# Patient Record
Sex: MALE | Race: WHITE | Employment: FULL TIME | URBAN - METROPOLITAN AREA
[De-identification: names, ages, dates, MRNs, and addresses within clinical notes are randomized per-mention and may not be internally consistent; named-entity substitution may affect disease eponyms.]

---

## 2017-01-16 ENCOUNTER — OFFICE VISIT (OUTPATIENT)
Dept: FAMILY MEDICINE CLINIC | Age: 72
End: 2017-01-16

## 2017-01-16 ENCOUNTER — HOSPITAL ENCOUNTER (OUTPATIENT)
Dept: LAB | Age: 72
Discharge: HOME | End: 2017-01-16
Payer: MEDICARE

## 2017-01-16 VITALS
SYSTOLIC BLOOD PRESSURE: 122 MMHG | BODY MASS INDEX: 29.2 KG/M2 | RESPIRATION RATE: 20 BRPM | HEIGHT: 70 IN | HEART RATE: 57 BPM | TEMPERATURE: 98.2 F | OXYGEN SATURATION: 98 % | DIASTOLIC BLOOD PRESSURE: 80 MMHG | WEIGHT: 204 LBS

## 2017-01-16 DIAGNOSIS — Z00.00 ROUTINE GENERAL MEDICAL EXAMINATION AT A HEALTH CARE FACILITY: ICD-10-CM

## 2017-01-16 DIAGNOSIS — Z71.89 ADVANCE CARE PLANNING: ICD-10-CM

## 2017-01-16 DIAGNOSIS — E03.4 HYPOTHYROIDISM DUE TO ACQUIRED ATROPHY OF THYROID: ICD-10-CM

## 2017-01-16 DIAGNOSIS — E03.4 HYPOTHYROIDISM DUE TO ACQUIRED ATROPHY OF THYROID: Primary | ICD-10-CM

## 2017-01-16 DIAGNOSIS — E78.00 PURE HYPERCHOLESTEROLEMIA: ICD-10-CM

## 2017-01-16 DIAGNOSIS — Z66 DNR (DO NOT RESUSCITATE): ICD-10-CM

## 2017-01-16 DIAGNOSIS — Z13.39 SCREENING FOR ALCOHOLISM: ICD-10-CM

## 2017-01-16 PROBLEM — K43.9 VENTRAL HERNIA WITHOUT OBSTRUCTION OR GANGRENE: Status: ACTIVE | Noted: 2017-01-16

## 2017-01-16 LAB
ALBUMIN SERPL BCP-MCNC: 3.7 G/DL (ref 3.4–5)
ALBUMIN/GLOB SERPL: 1.2 {RATIO} (ref 0.8–1.7)
ALP SERPL-CCNC: 73 U/L (ref 45–117)
ALT SERPL-CCNC: 26 U/L (ref 16–61)
ANION GAP BLD CALC-SCNC: 5 MMOL/L (ref 3–18)
AST SERPL W P-5'-P-CCNC: 19 U/L (ref 15–37)
BILIRUB SERPL-MCNC: 0.8 MG/DL (ref 0.2–1)
BUN SERPL-MCNC: 14 MG/DL (ref 7–18)
BUN/CREAT SERPL: 14 (ref 12–20)
CALCIUM SERPL-MCNC: 8.3 MG/DL (ref 8.5–10.1)
CHLORIDE SERPL-SCNC: 104 MMOL/L (ref 100–108)
CHOLEST SERPL-MCNC: 144 MG/DL
CO2 SERPL-SCNC: 31 MMOL/L (ref 21–32)
CREAT SERPL-MCNC: 1.01 MG/DL (ref 0.6–1.3)
ERYTHROCYTE [DISTWIDTH] IN BLOOD BY AUTOMATED COUNT: 13 % (ref 11.6–14.5)
GLOBULIN SER CALC-MCNC: 3.2 G/DL (ref 2–4)
GLUCOSE SERPL-MCNC: 79 MG/DL (ref 74–99)
HCT VFR BLD AUTO: 50.3 % (ref 36–48)
HDLC SERPL-MCNC: 44 MG/DL (ref 40–60)
HDLC SERPL: 3.3 {RATIO} (ref 0–5)
HGB BLD-MCNC: 17 G/DL (ref 13–16)
LDLC SERPL CALC-MCNC: 80.6 MG/DL (ref 0–100)
LIPID PROFILE,FLP: NORMAL
MCH RBC QN AUTO: 30.9 PG (ref 24–34)
MCHC RBC AUTO-ENTMCNC: 33.8 G/DL (ref 31–37)
MCV RBC AUTO: 91.5 FL (ref 74–97)
PLATELET # BLD AUTO: 225 K/UL (ref 135–420)
PMV BLD AUTO: 9.6 FL (ref 9.2–11.8)
POTASSIUM SERPL-SCNC: 4.5 MMOL/L (ref 3.5–5.5)
PROT SERPL-MCNC: 6.9 G/DL (ref 6.4–8.2)
RBC # BLD AUTO: 5.5 M/UL (ref 4.7–5.5)
SODIUM SERPL-SCNC: 140 MMOL/L (ref 136–145)
TRIGL SERPL-MCNC: 97 MG/DL (ref ?–150)
TSH SERPL DL<=0.05 MIU/L-ACNC: 8.33 UIU/ML (ref 0.36–3.74)
VLDLC SERPL CALC-MCNC: 19.4 MG/DL
WBC # BLD AUTO: 10.7 K/UL (ref 4.6–13.2)

## 2017-01-16 PROCEDURE — 84443 ASSAY THYROID STIM HORMONE: CPT | Performed by: INTERNAL MEDICINE

## 2017-01-16 PROCEDURE — 85027 COMPLETE CBC AUTOMATED: CPT | Performed by: INTERNAL MEDICINE

## 2017-01-16 PROCEDURE — 36415 COLL VENOUS BLD VENIPUNCTURE: CPT | Performed by: INTERNAL MEDICINE

## 2017-01-16 PROCEDURE — 80061 LIPID PANEL: CPT | Performed by: INTERNAL MEDICINE

## 2017-01-16 PROCEDURE — 80053 COMPREHEN METABOLIC PANEL: CPT | Performed by: INTERNAL MEDICINE

## 2017-01-16 RX ORDER — FLUTICASONE PROPIONATE 50 MCG
2 SPRAY, SUSPENSION (ML) NASAL DAILY
Qty: 3 BOTTLE | Refills: 3 | Status: SHIPPED | OUTPATIENT
Start: 2017-01-16 | End: 2017-10-27 | Stop reason: SDUPTHER

## 2017-01-16 RX ORDER — MONTELUKAST SODIUM 10 MG/1
10 TABLET ORAL DAILY
Qty: 90 TAB | Refills: 3 | Status: SHIPPED | OUTPATIENT
Start: 2017-01-16 | End: 2017-10-27 | Stop reason: SDUPTHER

## 2017-01-16 RX ORDER — LEVOTHYROXINE SODIUM 112 UG/1
TABLET ORAL
Qty: 90 TAB | Refills: 3 | Status: SHIPPED | OUTPATIENT
Start: 2017-01-16 | End: 2017-01-19 | Stop reason: DRUGHIGH

## 2017-01-16 RX ORDER — SIMVASTATIN 80 MG/1
TABLET, FILM COATED ORAL
Qty: 90 TAB | Refills: 3 | Status: SHIPPED | OUTPATIENT
Start: 2017-01-16 | End: 2017-10-27 | Stop reason: SDUPTHER

## 2017-01-16 NOTE — MR AVS SNAPSHOT
Visit Information Date & Time Provider Department Dept. Phone Encounter #  
 1[facility]16/2017 10:[phone] [id number] Upcoming Health Maintenance Date Due Pneumococcal 65+ Low/Medium Risk (2 of 2 - PPSV23) 1/16/2018 MEDICARE YEARLY EXAM 1/17/2018 GLAUCOMA SCREENING Q2Y 8/1/2018 COLONOSCOPY 4/18/2024 DTaP/Tdap/Td series (2 - Td) 1/16/2027 Allergies as of 1/16/2017  Review Complete On: 1/16/2017 By: Jose South MD  
  
 Severity Noted Reaction Type Reactions Latex  05/10/2013    Rash Current Immunizations  Never Reviewed Name Date Influenza Vaccine 10/11/2014 Influenza Vaccine Split 10/12/2012 Not reviewed this visit You Were Diagnosed With   
  
 Codes Comments Hypothyroidism due to acquired atrophy of thyroid    -  Primary ICD-10-CM: E03.4 ICD-9-CM: 244.8, 246.8 Pure hypercholesterolemia     ICD-10-CM: E78.00 ICD-9-CM: 272.0 Routine general medical examination at a health care facility     ICD-10-CM: Z00.00 ICD-9-CM: V70.0 Screening for alcoholism     ICD-10-CM: Z13.89 ICD-9-CM: V79.1 Advance care planning     ICD-10-CM: Z71.89 ICD-9-CM: V65.49 Vitals BP Pulse Temp Resp Height(growth percentile) Weight(growth percentile) 122/80 (BP 1 Location: Right arm, BP Patient Position: Sitting) (!) 57 98.2 °F (36.8 °C) 20 5' 10\" (1.778 m) 204 lb (92.5 kg) SpO2 BMI Smoking Status 98% 29.27 kg/m2 Former Smoker BMI and BSA Data Body Mass Index Body Surface Area  
 29.27 kg/m 2 2.14 m 2 Preferred Pharmacy Pharmacy Name Phone [facility]  [place] [id number] [address] [phone] Your Updated Medication List  
  
   
This list is accurate as of: 1/16/17 11:16 AM.  Always use your most recent med list.  
  
  
  
  
 fexofenadine 180 mg tablet Commonly known as:  Francisco Matias Take 1 Tab by mouth daily. fluticasone 50 mcg/actuation nasal spray Commonly known as:  Domnick Means 2 Sprays by Both Nostrils route daily. GLUCOSAMINE PO Take  by mouth daily. levothyroxine 112 mcg tablet Commonly known as:  SYNTHROID  
TAKE 1 TABLET DAILY BEFORE BREAKFAST  
  
 montelukast 10 mg tablet Commonly known as:  SINGULAIR Take 1 Tab by mouth daily. simvastatin 80 mg tablet Commonly known as:  ZOCOR  
TAKE 1 TABLET NIGHTLY Prescriptions Sent to Pharmacy Refills  
 montelukast (SINGULAIR) 10 mg tablet 3 Sig: Take 1 Tab by mouth daily. Class: Normal  
 Pharmacy: [address] [place] [address] Ph #: [phone] Route: Oral  
 simvastatin (ZOCOR) 80 mg tablet 3 Sig: TAKE 1 TABLET NIGHTLY Class: Normal  
 Pharmacy: [address] [place] [address] Ph #: [phone]  
 levothyroxine (SYNTHROID) 112 mcg tablet 3 Sig: TAKE 1 TABLET DAILY BEFORE BREAKFAST Class: Normal  
 Pharmacy: [address] [place] [address] Ph #: [phone]  
 fluticasone [facility]) 50 mcg/actuation nasal spray 3 Si Sprays by Both Nostrils route daily. Class: Normal  
 Pharmacy: [address] [place] [address] Ph #: [phone] Route: Both Nostrils To-Do List   
 2017 Lab:  CBC W/O DIFF   
  
 2017 Lab:  LIPID PANEL W/ REFLX DIRECT LDL   
  
 2017 Lab:  METABOLIC PANEL, COMPREHENSIVE   
  
 2017 Lab:  TSH 3RD GENERATION Patient Instructions Medicare Part B Preventive Services Limitations Recommendation Scheduled Bone Mass Measurement 
(age 72 & older, biennial) Requires diagnosis related to osteoporosis or estrogen deficiency. Biennial benefit unless patient has history of long-term glucocorticoid tx or baseline is needed because initial test was by other method Cardiovascular Screening Blood Tests (every 5 years) Total cholesterol, HDL, Triglycerides Order as a panel if possible Colorectal Cancer Screening 
-Fecal occult blood test (annual) -Flexible sigmoidoscopy (5y) 
-Screening colonoscopy (10y) -Barium Enema Counseling to Prevent Tobacco Use (up to 8 sessions per year) - Counseling greater than 3 and up to 10 minutes - Counseling greater than 10 minutes Patients must be asymptomatic of tobacco-related conditions to receive as preventive service Diabetes Screening Tests (at least every 3 years, Medicare covers annually or at 6-month intervals for prediabetic patients) Fasting blood sugar (FBS) or glucose tolerance test (GTT) Patient must be diagnosed with one of the following: 
-Hypertension, Dyslipidemia, obesity, previous impaired FBS or GTT 
Or any two of the following: overweight, FH of diabetes, age ? 72, history of gestational diabetes, birth of baby weighing more than 9 pounds Diabetes Self-Management Training (DSMT) (no USPSTF recommendation) Requires referral by treating physician for patient with diabetes or renal disease. 10 hours of initial DSMT session of no less than 30 minutes each in a continuous 12-month period. 2 hours of follow-up DSMT in subsequent years. Glaucoma Screening (no USPSTF recommendation) Diabetes mellitus, family history, , age 48 or over,  American, age 72 or over Human Immunodeficiency Virus (HIV) Screening (annually for increased risk patients) HIV-1 and HIV-2 by EIA, BERNARD, rapid antibody test, or oral mucosa transudate Patient must be at increased risk for HIV infection per USPSTF guidelines or pregnant. Tests covered annually for patients at increased risk. Pregnant patients may receive up to 3 test during pregnancy.     
Medical Nutrition Therapy (MNT) (for diabetes or renal disease not recommended schedule) Requires referral by treating physician for patient with diabetes or renal disease. Can be provided in same year as diabetes self-management training (DSMT), and CMS recommends medical nutrition therapy take place after DSMT. Up to 3 hours for initial year and 2 hours in subsequent years. Prostate Cancer Screening (annually up to age 76) - Digital rectal exam (ROSS) - Prostate specific antigen (PSA) Annually (age 48 or over), ROSS not paid separately when covered E/M service is provided on same date Men up to age 76 may need a screening blood test for prostate cancer at certain intervals, depending on their personal and family history. This decision is between the patient and his provider. Seasonal Influenza Vaccination (annually) Pneumococcal Vaccination (once after 65) Hepatitis B Vaccinations (if medium/high risk) Medium/high risk factors:  End-stage renal disease, Hemophiliacs who received Factor VIII or IX concentrates, Clients of institutions for the mentally retarded, Persons who live in the same house as a HepB virus carrier, Homosexual men, Illicit injectable drug abusers. Shingles Vaccination A shingles vaccine is also recommended once in a lifetime after age 61 Ultrasound Screening for Abdominal Aortic Aneurysm (AAA) (once) Patient must be referred through [facility] and not have had a screening for abdominal aortic aneurysm before under Medicare. Limited to patients who meet one of the following criteria: 
- Men who are 73-68 years old and have smoked more than 100 cigarettes in their lifetime. 
-Anyone with a FH of AAA 
-Anyone recommended for screening by USPSTF Introducing [facility]! Ludy Moody introduces [employer] patient portal. Now you can access parts of your medical record, email your doctor's office, and request medication refills online. 1. In your internet browser, go to [email or web address] 2. Click on the First Time User? Click Here link in the Sign In box.  You will see the New Member Sign Up page. 3. Enter your [employer] Access Code exactly as it appears below. You will not need to use this code after youve completed the sign-up process. If you do not sign up before the expiration date, you must request a new code. · [employer] Access Code: [email or web address] Expires: 3/12/2017  9:52 AM 
 
4. Enter the last four digits of your Social Security Number (xxxx) and Date of Birth (mm/dd/yyyy) as indicated and click Submit. You will be taken to the next sign-up page. 5. Create a [employer] ID. This will be your [employer] login ID and cannot be changed, so think of one that is secure and easy to remember. 6. Create a [employer] password. You can change your password at any time. 7. Enter your Password Reset Question and Answer. This can be used at a later time if you forget your password. 8. Enter your e-mail address. You will receive e-mail notification when new information is available in [address] 9. Click Sign Up. You can now view and download portions of your medical record. 10. Click the Download Summary menu link to download a portable copy of your medical information. If you have questions, please visit the Frequently Asked Questions section of the [employer] website. Remember, [employer] is NOT to be used for urgent needs. For medical emergencies, dial 911. Now available from your iPhone and Android! Please provide this summary of care documentation to your next provider. Your primary care clinician is listed as Patria 13. If you have any questions after today's visit, please call [phone].

## 2017-01-16 NOTE — PROGRESS NOTES
Assessment/Plan:    1. Hypothyroidism due to acquired atrophy of thyroid  - TSH 3RD GENERATION; Future    2. Pure hypercholesterolemia  - METABOLIC PANEL, COMPREHENSIVE; Future  - LIPID PANEL W/ REFLX DIRECT LDL; Future    3. Routine general medical examination at a health care facility  - METABOLIC PANEL, COMPREHENSIVE; Future  - LIPID PANEL W/ REFLX DIRECT LDL; Future  - CBC W/O DIFF; Future    4. Screening for alcoholism    5. Advance care planning    The plan was discussed with the patient. The patient verbalized understanding and is in agreement with the plan. All medication potential side effects were discussed with the patient. Health Maintenance:   Health Maintenance   Topic Date Due    Pneumococcal 65+ Low/Medium Risk (2 of 2 - PPSV23) 01/16/2018    MEDICARE YEARLY EXAM  01/17/2018    GLAUCOMA SCREENING Q2Y  08/01/2018    COLONOSCOPY  04/18/2024    DTaP/Tdap/Td series (2 - Td) 01/16/2027    ZOSTER VACCINE AGE 60>  Addressed    INFLUENZA AGE 9 TO ADULT  Completed     Oral Gissell is a 66 y.o. male and presents with Annual Wellness Visit     Subjective:  Pt feels well. Here for [address] Hypothyroid - due for labs. On synthroid. HLD- on statin. No side effects. ROS:  Constitutional: No recent weight change. No weakness/fatigue. No f/c. Skin: No rashes, change in nails/hair, itching   HENT: No HA, dizziness. No hearing loss/tinnitus. No nasal congestion/discharge. Eyes: No change in vision, double/blurred vision or eye pain/redness. Cardiovascular: No CP/palpitations. No SOSA/orthopnea/PND. Respiratory: No cough/sputum, dyspnea, wheezing. Gastointestinal: No dysphagia, reflux. No n/v. No constipation/diarrhea. No melena/rectal bleeding. Genitourinary: No dysuria, urinary hesitancy, nocturia, hematuria. No incontinence. Musculoskeletal: No joint pain/stiffness. No muscle pain/tenderness. Endo: No heat/cold intolerance, no polyuria/polydypsia.    Heme: No h/o anemia. No easy bleeding/bruising. Allergy/Immunology: No seasonal rhinitis. Denies frequent colds, sinus/ear infections. Neurological: No seizures/numbness/weakness. No paresthesias. Psychiatric:  No depression, anxiety. The problem list was updated as a part of today's visit. Patient Active Problem List   Diagnosis Code    ED (erectile dysfunction) N52.9    Eczema L30.9    Hypothyroid E03.9    Allergic rhinitis J30.9    Pure hypercholesterolemia E78.00    SOSA (dyspnea on exertion) R06.09    Pulmonary nodule R91.1    ERIK on CPAP G47.33    Centrilobular emphysema (HCC) J43.2     The PSH, FH were reviewed. SH:  Social History   Substance Use Topics    Smoking status: Former Smoker     Years: 8.00     Types: Cigarettes, Pipe    Smokeless tobacco: Never Used      Comment: 1971    Alcohol use No     Medications/Allergies:  Current Outpatient Prescriptions on File Prior to Visit   Medication Sig Dispense Refill    fluticasone (FLONASE) 50 mcg/actuation nasal spray 2 Sprays by Both Nostrils route daily.  montelukast (SINGULAIR) 10 mg tablet Take 1 Tab by mouth daily. 90 Tab 2    simvastatin (ZOCOR) 80 mg tablet TAKE 1 TABLET NIGHTLY 90 Tab 2    levothyroxine (SYNTHROID) 112 mcg tablet TAKE 1 TABLET DAILY BEFORE BREAKFAST 90 Tab 2    fexofenadine (ALLEGRA) 180 mg tablet Take 1 Tab by mouth daily. 90 Tab 3    GLUCOSAMINE SULFATE (GLUCOSAMINE PO) Take  by mouth daily. No current facility-administered medications on file prior to visit. Allergies   Allergen Reactions    Latex Rash     Objective:  Visit Vitals    /80 (BP 1 Location: Right arm, BP Patient Position: Sitting)    Pulse (!) 57    Temp 98.2 °F (36.8 °C)    Resp 20    Ht 5' 10\" (1.778 m)    Wt 204 lb (92.5 kg)    SpO2 98%    BMI 29.27 kg/m2      Constitutional: Well developed, nourished, no distress, alert   HENT: Exterior ears and tympanic membranes normal bilaterally. Supple neck.  No thyromegaly or lymphadenopathy. Oropharynx clear and moist mucous membranes. Eyes: Conjunctiva normal. PERRL. CV: S1, S2.  RRR. No murmurs/rubs. No thrills palpated. No carotid bruits. Intact distal pulses. No edema. Pulm: No abnormalities on inspection. Clear to auscultation bilaterally. No wheezing/rhonchi. Normal effort. GI: Soft, nontender, nondistended. Normal active bowel sounds. MS: Gait normal.  Joints without deformity/tenderness. Strength intact bilateral upper and lower ext. Normal ROM all extremities. Neuro: A/O x 3. No focal motor or sensory deficits. Speech normal.   Skin: No lesions/rashes on inspection. Psych: Appropriate affect, judgement and insight. Short-term memory intact. Labwork and Ancillary Studies:    CBC w/Diff  Lab Results   Component Value Date/Time    WBC 9.7 03/28/2016 08:43 AM    HGB 17.3 03/28/2016 08:43 AM    PLATELET 840 30/14/6901 08:43 AM         Basic Metabolic Profile/LFTs  Lab Results   Component Value Date/Time    Sodium 142 03/28/2016 08:43 AM    Potassium 4.8 03/28/2016 08:43 AM    Chloride 107 03/28/2016 08:43 AM    CO2 29 03/28/2016 08:43 AM    Anion gap 6 03/28/2016 08:43 AM    Glucose 105 03/28/2016 08:43 AM    BUN 12 03/28/2016 08:43 AM    Creatinine 1.05 03/28/2016 08:43 AM    BUN/Creatinine ratio 11 03/28/2016 08:43 AM    GFR est AA >60 03/28/2016 08:43 AM    GFR est non-AA >60 03/28/2016 08:43 AM    Calcium 8.9 03/28/2016 08:43 AM      Lab Results   Component Value Date/Time    ALT 23 03/28/2016 08:43 AM    AST 19 03/28/2016 08:43 AM    Alk.  phosphatase 76 03/28/2016 08:43 AM    Bilirubin, total 0.7 03/28/2016 08:43 AM       Cholesterol  Lab Results   Component Value Date/Time    Cholesterol, total 145 03/28/2016 08:43 AM    HDL Cholesterol 56 03/28/2016 08:43 AM    LDL, calculated 69.6 03/28/2016 08:43 AM    Triglyceride 97 03/28/2016 08:43 AM    CHOL/HDL Ratio 2.6 03/28/2016 08:43 AM           This is a Subsequent Dionne Visit providing Personalized Prevention Plan Services (PPPS) (Performed 12 months after initial AWV and PPPS )    I have reviewed the patient's medical history in detail and updated the computerized patient record. History     Past Medical History   Diagnosis Date    Allergic rhinitis     Eczema     Hypercholesterolemia     S/P colonoscopy      pt to schedule 5/12    Shortness of breath     Sinusitis chronic, maxillary     Sleep apnea      does not use a cpap    T. I.A.      2005    Thyroid disease       Past Surgical History   Procedure Laterality Date    Hx appendectomy      Pr nasal scope,bx/rmv polyp/debrid      Hx hernia repair      Hx orthopaedic       right shoulder surgery 1973     Current Outpatient Prescriptions   Medication Sig Dispense Refill    fluticasone (FLONASE) 50 mcg/actuation nasal spray 2 Sprays by Both Nostrils route daily.  montelukast (SINGULAIR) 10 mg tablet Take 1 Tab by mouth daily. 90 Tab 2    simvastatin (ZOCOR) 80 mg tablet TAKE 1 TABLET NIGHTLY 90 Tab 2    levothyroxine (SYNTHROID) 112 mcg tablet TAKE 1 TABLET DAILY BEFORE BREAKFAST 90 Tab 2    fexofenadine (ALLEGRA) 180 mg tablet Take 1 Tab by mouth daily. 90 Tab 3    GLUCOSAMINE SULFATE (GLUCOSAMINE PO) Take  by mouth daily.        Allergies   Allergen Reactions    Latex Rash     Family History   Problem Relation Age of Onset    High Cholesterol Mother     Diabetes Father     Diabetes Brother      Social History   Substance Use Topics    Smoking status: Former Smoker     Years: 8.00     Types: Cigarettes, Pipe    Smokeless tobacco: Never Used      Comment: 1971    Alcohol use No     Patient Active Problem List   Diagnosis Code    ED (erectile dysfunction) N52.9    Eczema L30.9    Hypothyroid E03.9    Allergic rhinitis J30.9    Pure hypercholesterolemia E78.00    SOSA (dyspnea on exertion) R06.09    Pulmonary nodule R91.1    ERIK on CPAP G47.33    Centrilobular emphysema (HCC) J43.2 Depression Risk Factor Screening:     PHQ 2 / 9, over the last two weeks 1/16/2017   Little interest or pleasure in doing things Not at all   Feeling down, depressed or hopeless Not at all   Total Score PHQ 2 0     Alcohol Risk Factor Screening: On any occasion during the past 3 months, have you had more than 4 drinks containing alcohol? No    Do you average more than 14 drinks per week? No    Functional Ability and Level of Safety:     Hearing Loss   normal-to-mild    Activities of Daily Living   Self-care. Requires assistance with: no ADLs    Fall Risk     Fall Risk Assessment, last 12 mths 1/16/2017   Able to walk? Yes   Fall in past 12 months? No     Abuse Screen   Patient is not abused    Review of Systems   Pertinent items are noted in HPI. Physical Examination     Evaluation of Cognitive Function:  Mood/affect:  happy  Appearance: age appropriate  Cognition: normal    Patient Care Team:  Nelsy Carmona MD as PCP - General (Internal Medicine)  Alyssa uGy RN as Ambulatory Care Navigator    Advice/Referrals/Counseling   Education and counseling provided:  Are appropriate based on today's review and evaluation  End-of-Life planning (with patient's consent)    Assessment/Plan       ICD-10-CM ICD-9-CM    1. Hypothyroidism due to acquired atrophy of thyroid E03.4 244.8 TSH 3RD GENERATION     246.8    2. Pure hypercholesterolemia Q96.44 697.0 METABOLIC PANEL, COMPREHENSIVE      LIPID PANEL W/ REFLX DIRECT LDL   3. Routine general medical examination at a health care facility D40.24 B38.3 METABOLIC PANEL, COMPREHENSIVE      LIPID PANEL W/ REFLX DIRECT LDL      CBC W/O DIFF   4. Screening for alcoholism Z13.89 V79.1    5. Advance care planning Z71.89 V65.49    .

## 2017-01-16 NOTE — PROGRESS NOTES
Haresh Stearns is a 66 y.o. male  Chief Complaint   Patient presents with   [facility] Annual Wellness Visit     1. Have you been to the ER, urgent care clinic since your last visit? Hospitalized since your last visit? No    2. Have you seen or consulted any other health care providers outside of the [address] [place] [address] since your last visit? Include any pap smears or colon screening.  No

## 2017-01-16 NOTE — PATIENT INSTRUCTIONS
Medicare Part B Preventive Services Limitations Recommendation Scheduled   Bone Mass Measurement  (age 72 & older, biennial) Requires diagnosis related to osteoporosis or estrogen deficiency. Biennial benefit unless patient has history of long-term glucocorticoid tx or baseline is needed because initial test was by other method     Cardiovascular Screening Blood Tests (every 5 years)  Total cholesterol, HDL, Triglycerides Order as a panel if possible     Colorectal Cancer Screening  -Fecal occult blood test (annual)  -Flexible sigmoidoscopy (5y)  -Screening colonoscopy (10y)  -Barium Enema      Counseling to Prevent Tobacco Use (up to 8 sessions per year)  - Counseling greater than 3 and up to 10 minutes  - Counseling greater than 10 minutes Patients must be asymptomatic of tobacco-related conditions to receive as preventive service     Diabetes Screening Tests (at least every 3 years, Medicare covers annually or at 6-month intervals for prediabetic patients)    Fasting blood sugar (FBS) or glucose tolerance test (GTT) Patient must be diagnosed with one of the following:  -Hypertension, Dyslipidemia, obesity, previous impaired FBS or GTT  Or any two of the following: overweight, FH of diabetes, age ? 72, history of gestational diabetes, birth of baby weighing more than 9 pounds     Diabetes Self-Management Training (DSMT) (no USPSTF recommendation) Requires referral by treating physician for patient with diabetes or renal disease. 10 hours of initial DSMT session of no less than 30 minutes each in a continuous 12-month period. 2 hours of follow-up DSMT in subsequent years.      Glaucoma Screening (no USPSTF recommendation) Diabetes mellitus, family history, , age 48 or over,  American, age 72 or over     Human Immunodeficiency Virus (HIV) Screening (annually for increased risk patients)  HIV-1 and HIV-2 by EIA, BERNARD, rapid antibody test, or oral mucosa transudate Patient must be at increased risk for HIV infection per USPSTF guidelines or pregnant. Tests covered annually for patients at increased risk. Pregnant patients may receive up to 3 test during pregnancy. Medical Nutrition Therapy (MNT) (for diabetes or renal disease not recommended schedule) Requires referral by treating physician for patient with diabetes or renal disease. Can be provided in same year as diabetes self-management training (DSMT), and CMS recommends medical nutrition therapy take place after DSMT. Up to 3 hours for initial year and 2 hours in subsequent years. Prostate Cancer Screening (annually up to age 76)  - Digital rectal exam (ROSS)  - Prostate specific antigen (PSA) Annually (age 48 or over), ROSS not paid separately when covered E/M service is provided on same date  Men up to age 76 may need a screening blood test for prostate cancer at certain intervals, depending on their personal and family history. This decision is between the patient and his provider. Seasonal Influenza Vaccination (annually)        Pneumococcal Vaccination (once after 72)      Hepatitis B Vaccinations (if medium/high risk) Medium/high risk factors:  End-stage renal disease,  Hemophiliacs who received Factor VIII or IX concentrates, Clients of institutions for the mentally retarded, Persons who live in the same house as a HepB virus carrier, Homosexual men, Illicit injectable drug abusers. Shingles Vaccination A shingles vaccine is also recommended once in a lifetime after age 61     Ultrasound Screening for Abdominal Aortic Aneurysm (AAA) (once) Patient must be referred through [facility] and not have had a screening for abdominal aortic aneurysm before under Medicare.   Limited to patients who meet one of the following criteria:  - Men who are 73-68 years old and have smoked more than 100 cigarettes in their lifetime.  -Anyone with a FH of AAA  -Anyone recommended for screening by USPSTF

## 2017-01-18 NOTE — PROGRESS NOTES
Tell pt his labs show he isn't getting enough thyroid. Has he been taking his thyroid med? If he has, I'm going to increase the dose and recheck his thyroid in 2 mos. Otherwise, labs looked good.

## 2017-01-19 RX ORDER — LEVOTHYROXINE SODIUM 125 UG/1
125 TABLET ORAL
Qty: 90 TAB | Refills: 0 | Status: SHIPPED | OUTPATIENT
Start: 2017-01-19 | End: 2017-04-11 | Stop reason: SDUPTHER

## 2017-01-19 NOTE — PROGRESS NOTES
Call placed to patient, notified, patient states he does take every day. Please order labs for two months, refill encounter sent for increased dose.

## 2017-04-10 ENCOUNTER — HOSPITAL ENCOUNTER (OUTPATIENT)
Dept: LAB | Age: 72
Discharge: HOME | End: 2017-04-10
Payer: MEDICARE

## 2017-04-10 ENCOUNTER — OFFICE VISIT (OUTPATIENT)
Dept: FAMILY MEDICINE CLINIC | Age: 72
End: 2017-04-10

## 2017-04-10 VITALS
WEIGHT: 198.4 LBS | HEIGHT: 70 IN | DIASTOLIC BLOOD PRESSURE: 62 MMHG | HEART RATE: 76 BPM | SYSTOLIC BLOOD PRESSURE: 104 MMHG | BODY MASS INDEX: 28.4 KG/M2 | OXYGEN SATURATION: 96 % | TEMPERATURE: 97.6 F | RESPIRATION RATE: 16 BRPM

## 2017-04-10 DIAGNOSIS — M25.511 ACUTE PAIN OF RIGHT SHOULDER: Primary | ICD-10-CM

## 2017-04-10 DIAGNOSIS — D58.2 ELEVATED HEMOGLOBIN (HCC): ICD-10-CM

## 2017-04-10 DIAGNOSIS — E03.4 HYPOTHYROIDISM DUE TO ACQUIRED ATROPHY OF THYROID: ICD-10-CM

## 2017-04-10 LAB
ERYTHROCYTE [DISTWIDTH] IN BLOOD BY AUTOMATED COUNT: 13.7 % (ref 11.6–14.5)
HCT VFR BLD AUTO: 52.5 % (ref 36–48)
HGB BLD-MCNC: 17.5 G/DL (ref 13–16)
MCH RBC QN AUTO: 31.2 PG (ref 24–34)
MCHC RBC AUTO-ENTMCNC: 33.3 G/DL (ref 31–37)
MCV RBC AUTO: 93.6 FL (ref 74–97)
PLATELET # BLD AUTO: 263 K/UL (ref 135–420)
PMV BLD AUTO: 9.8 FL (ref 9.2–11.8)
RBC # BLD AUTO: 5.61 M/UL (ref 4.7–5.5)
TSH SERPL DL<=0.05 MIU/L-ACNC: 0.17 UIU/ML (ref 0.36–3.74)
WBC # BLD AUTO: 9.6 K/UL (ref 4.6–13.2)

## 2017-04-10 PROCEDURE — 36415 COLL VENOUS BLD VENIPUNCTURE: CPT | Performed by: INTERNAL MEDICINE

## 2017-04-10 PROCEDURE — 85027 COMPLETE CBC AUTOMATED: CPT | Performed by: INTERNAL MEDICINE

## 2017-04-10 PROCEDURE — 84443 ASSAY THYROID STIM HORMONE: CPT | Performed by: INTERNAL MEDICINE

## 2017-04-10 RX ORDER — ACETAMINOPHEN 500 MG
TABLET ORAL
COMMUNITY

## 2017-04-10 RX ORDER — LEVOTHYROXINE SODIUM 125 UG/1
125 TABLET ORAL
Qty: 90 TAB | Refills: 0 | Status: CANCELLED | OUTPATIENT
Start: 2017-04-10

## 2017-04-10 NOTE — PROGRESS NOTES
Assessment/Plan:    1. Acute pain of right shoulder  -possible rotator cuff pathology. There could be element of nerve impingement as well given description. Pt to make appt with ortho, bring xray images. May need MRI or NCS to further evaluate paresthesias  - XR SHOULDER RT AP/LAT MIN 2 V; Future    2. Hypothyroidism due to acquired atrophy of thyroid  -dose had been incr to 125mcg. Po. - TSH 3RD GENERATION; Future    3. Elevated hemoglobin (HCC)  -likely related to ERIK  - CBC W/O DIFF; Future    The plan was discussed with the patient. The patient verbalized understanding and is in agreement with the plan. All medication potential side effects were discussed with the patient. Health Maintenance:   Health Maintenance   Topic Date Due    Pneumococcal 65+ Low/Medium Risk (2 of 2 - PPSV23) 01/16/2018    MEDICARE YEARLY EXAM  01/17/2018    GLAUCOMA SCREENING Q2Y  08/01/2018    COLONOSCOPY  04/18/2024    DTaP/Tdap/Td series (2 - Td) 01/16/2027    ZOSTER VACCINE AGE 60>  Addressed    INFLUENZA AGE 9 TO ADULT  Completed       Pacheco Damian is a 66 y.o. male and presents with Shoulder Pain (right)     Subjective:  Pt has h/o dislocated shoulder in 1973. Had surgery with pin placement. States it has never been a problem until 2 mos ago. Has been doing an exercise program with dumbell weights. States 2 mos ago, it \"seems like something tore\". Started having pain when he was doing a pushup and doing dumbell weights. Pain is a burning sensation in posterior shoulder and into triceps area. Pushing downward against resistance with arm forward flexed. Pain also with abduction of arm. He had some paresthesia yesterday from arm extending to hand. No neck pain. ROS:  Constitutional: No recent weight change. No weakness/fatigue. No f/c. Cardiovascular: No CP/palpitations. No SOSA/orthopnea/PND. Respiratory: No cough/sputum, dyspnea, wheezing. Gastointestinal: No dysphagia, reflux.   No n/v. No constipation/diarrhea. No melena/rectal bleeding. Musculoskeletal: + joint pain/no stiffness. No muscle pain/tenderness. Neurological: No seizures/numbness/weakness. No paresthesias. The problem list was updated as a part of today's visit. Patient Active Problem List   Diagnosis Code    ED (erectile dysfunction) N52.9    Eczema L30.9    Hypothyroid E03.9    Allergic rhinitis J30.9    Pure hypercholesterolemia E78.00    SOSA (dyspnea on exertion) R06.09    Pulmonary nodule R91.1    ERIK on CPAP G47.33, Z99.89    Centrilobular emphysema (HCC) J43.2    Advance care planning Z71.89    Ventral hernia without obstruction or gangrene K43.9       The PSH, FH were reviewed. SH:  Social History   Substance Use Topics    Smoking status: Former Smoker     Years: 8.00     Types: Cigarettes, Pipe    Smokeless tobacco: Never Used      Comment: 1971    Alcohol use No       Medications/Allergies:  Current Outpatient Prescriptions on File Prior to Visit   Medication Sig Dispense Refill    levothyroxine (SYNTHROID) 125 mcg tablet Take 1 Tab by mouth Daily (before breakfast). 90 Tab 0    montelukast (SINGULAIR) 10 mg tablet Take 1 Tab by mouth daily. 90 Tab 3    simvastatin (ZOCOR) 80 mg tablet TAKE 1 TABLET NIGHTLY 90 Tab 3    fluticasone (FLONASE) 50 mcg/actuation nasal spray 2 Sprays by Both Nostrils route daily. 3 Bottle 3    fexofenadine (ALLEGRA) 180 mg tablet Take 1 Tab by mouth daily. 90 Tab 3    GLUCOSAMINE SULFATE (GLUCOSAMINE PO) Take  by mouth daily. No current facility-administered medications on file prior to visit.          Allergies   Allergen Reactions    Latex Rash     Objective:  Visit Vitals    /62 (BP 1 Location: Left arm, BP Patient Position: Sitting)    Pulse 76    Temp 97.6 °F (36.4 °C) (Oral)    Resp 16    Ht 5' 10\" (1.778 m)    Wt 198 lb 6.4 oz (90 kg)    SpO2 96%    BMI 28.47 kg/m2      Constitutional: Well developed, nourished, no distress, alert   CV: S1, S2.  RRR. No murmurs/rubs. No thrills palpated. No carotid bruits. Intact distal pulses. No edema. Pulm: No abnormalities on inspection. Clear to auscultation bilaterally. No wheezing/rhonchi. Normal effort. MS: Gait normal.  No deltoid bursa tenderness. No triceps or biceps tenderness. Pain past 90 degrees abduction and forward flexion. No pain with supination. Neuro: A/O x 3. No focal motor or sensory deficits. Speech normal.   Psych: Appropriate affect, judgement and insight. Short-term memory intact. Labwork and Ancillary Studies:    CBC w/Diff  Lab Results   Component Value Date/Time    WBC 10.7 01/16/2017 11:28 AM    HGB 17.0 01/16/2017 11:28 AM    PLATELET 308 23/68/9059 11:28 AM         Basic Metabolic Profile/LFTs  Lab Results   Component Value Date/Time    Sodium 140 01/16/2017 11:28 AM    Potassium 4.5 01/16/2017 11:28 AM    Chloride 104 01/16/2017 11:28 AM    CO2 31 01/16/2017 11:28 AM    Anion gap 5 01/16/2017 11:28 AM    Glucose 79 01/16/2017 11:28 AM    BUN 14 01/16/2017 11:28 AM    Creatinine 1.01 01/16/2017 11:28 AM    BUN/Creatinine ratio 14 01/16/2017 11:28 AM    GFR est AA >60 01/16/2017 11:28 AM    GFR est non-AA >60 01/16/2017 11:28 AM    Calcium 8.3 01/16/2017 11:28 AM      Lab Results   Component Value Date/Time    ALT (SGPT) 26 01/16/2017 11:28 AM    AST (SGOT) 19 01/16/2017 11:28 AM    Alk.  phosphatase 73 01/16/2017 11:28 AM    Bilirubin, total 0.8 01/16/2017 11:28 AM       Cholesterol  Lab Results   Component Value Date/Time    Cholesterol, total 144 01/16/2017 11:28 AM    HDL Cholesterol 44 01/16/2017 11:28 AM    LDL, calculated 80.6 01/16/2017 11:28 AM    Triglyceride 97 01/16/2017 11:28 AM    CHOL/HDL Ratio 3.3 01/16/2017 11:28 AM

## 2017-04-10 NOTE — MR AVS SNAPSHOT
Visit Information Date & Time Provider Department Dept. Phone Encounter #  
 4/10/2017  9:00 AM Endylizzy Gomez, [facility] [phone] [id number] Upcoming Health Maintenance Date Due Pneumococcal 65+ Low/Medium Risk (2 of 2 - PPSV23) 1/16/2018 MEDICARE YEARLY EXAM 1/17/2018 GLAUCOMA SCREENING Q2Y 8/1/2018 COLONOSCOPY 4/18/2024 DTaP/Tdap/Td series (2 - Td) 1/16/2027 Allergies as of 4/10/2017  Review Complete On: 4/10/2017 By: Lieutenant Patricia MD  
  
 Severity Noted Reaction Type Reactions Latex  05/10/2013    Rash Current Immunizations  Never Reviewed Name Date Influenza Vaccine 10/11/2014 Influenza Vaccine Split 10/12/2012 Not reviewed this visit You Were Diagnosed With   
  
 Codes Comments Hypothyroidism due to acquired atrophy of thyroid    -  Primary ICD-10-CM: E03.4 ICD-9-CM: 244.8, 246.8 Elevated hemoglobin (HCC)     ICD-10-CM: S82.3 ICD-9-CM: 282.7 Acute pain of right shoulder     ICD-10-CM: M25.511 ICD-9-CM: 719.41 Vitals BP Pulse Temp Resp Height(growth percentile) Weight(growth percentile) 104/62 (BP 1 Location: Left arm, BP Patient Position: Sitting) 76 97.6 °F (36.4 °C) (Oral) 16 5' 10\" (1.778 m) 198 lb 6.4 oz (90 kg) SpO2 BMI Smoking Status 96% 28.47 kg/m2 Former Smoker Vitals History BMI and BSA Data Body Mass Index Body Surface Area  
 28.47 kg/m 2 2.11 m 2 [facility] [address] [place] [address] [phone] Your Updated Medication List  
  
   
This list is accurate as of: 4/10/17  9:25 AM.  Always use your most recent med list.  
  
  
  
  
 acetaminophen 500 mg tablet Commonly known as:  TYLENOL Take  by mouth every six (6) hours as needed for Pain. fexofenadine 180 mg tablet Commonly known as:  Jeniffer Micheal Take 1 Tab by mouth daily. fluticasone 50 mcg/actuation nasal spray Commonly known as:  Algernon Lara 2 Sprays by Both Nostrils route daily. GLUCOSAMINE PO Take  by mouth daily. levothyroxine 125 mcg tablet Commonly known as:  SYNTHROID Take 1 Tab by mouth Daily (before breakfast). montelukast 10 mg tablet Commonly known as:  SINGULAIR Take 1 Tab by mouth daily. simvastatin 80 mg tablet Commonly known as:  ZOCOR  
TAKE 1 TABLET NIGHTLY To-Do List   
 04/10/2017 Lab:  CBC W/O DIFF   
  
 04/10/2017 Lab:  TSH 3RD GENERATION   
  
 04/10/2017 Imaging:  XR SHOULDER RT AP/LAT MIN 2 V Patient Instructions You have been referred to orthopedic surgery. Please call one of the preferred providers listed below and schedule your appointment. Once you have scheduled your appointment, please call the office at [phone] and leave the details of your appointment (provider you will be seeing, appointment date and time) on the voice mail. [address] [place] [address]., [address], [address] [place] [address] Spine: Dr. Madeleine Pendleton, Dr. Maria Teresa Marshall Hand: Dr. Rah Hernandez, Dr. Inder Fraser Foot/Ankle: Dr. Juhi Velasquez, Dr. Michael Danielle Knee/Hip: Dr. Dwight Esquivel, Dr. Michael Danielle Introducing [facility]! [facility] introduces [employer] patient portal. Now you can access parts of your medical record, email your doctor's office, and request medication refills online. 1. In your internet browser, go to [email or web address] 2. Click on the First Time User? Click Here link in the Sign In box. You will see the New Member Sign Up page. 3. Enter your [employer] Access Code exactly as it appears below. You will not need to use this code after youve completed the sign-up process. If you do not sign up before the expiration date, you must request a new code. · [employer] Access Code: [email or web address] Expires: 7/9/2017  9:01 AM 
 
4. Enter the last four digits of your Social Security Number (xxxx) and Date of Birth (mm/dd/yyyy) as indicated and click Submit. You will be taken to the next sign-up page. 5. Create a [employer] ID. This will be your [employer] login ID and cannot be changed, so think of one that is secure and easy to remember. 6. Create a [employer] password. You can change your password at any time. 7. Enter your Password Reset Question and Answer. This can be used at a later time if you forget your password. 8. Enter your e-mail address. You will receive e-mail notification when new information is available in [address] 9. Click Sign Up. You can now view and download portions of your medical record. 10. Click the Download Summary menu link to download a portable copy of your medical information. If you have questions, please visit the Frequently Asked Questions section of the [employer] website. Remember, [employer] is NOT to be used for urgent needs. For medical emergencies, dial 911. Now available from your iPhone and Android! Please provide this summary of care documentation to your next provider. Your primary care clinician is listed as Patria 13. If you have any questions after today's visit, please call [phone].

## 2017-04-10 NOTE — PROGRESS NOTES
Sandra Orourke is a 66 y.o. male presents today for right shoulder pain for 2 months. Depression Screening: Completed  Fall Risk Screening: Completed    1. Have you been to the ER, urgent care clinic since your last visit? Hospitalized since your last visit? No    2. Have you seen or consulted any other health care providers outside of the [facility] since your last visit? Include any pap smears or colon screening.  No

## 2017-04-10 NOTE — PATIENT INSTRUCTIONS
You have been referred to orthopedic surgery. Please call one of the preferred providers listed below and schedule your appointment. Once you have scheduled your appointment, please call the office at [phone] and leave the details of your appointment (provider you will be seeing, appointment date and time) on the voice mail.     [address] [facility], [address] [place] [address], [address] [place] [address]      Spine: Dr. Amish Rosas, Dr. Colonel Person: Dr. Idalmis Leyva Chi  Foot/Ankle: Dr. Ayaka Park, Dr. Johnathan Camp  Knee/Hip: Dr. Hellen Collins, Dr. Lia Mendez  M-F 4-9pm  [address] [facility]   [place]  [phone]

## 2017-04-11 RX ORDER — LEVOTHYROXINE SODIUM 125 UG/1
TABLET ORAL
Qty: 60 TAB | Refills: 0 | Status: SHIPPED | OUTPATIENT
Start: 2017-04-11 | End: 2017-07-06 | Stop reason: DRUGHIGH

## 2017-04-11 RX ORDER — LEVOTHYROXINE SODIUM 112 UG/1
TABLET ORAL
Qty: 30 TAB | Refills: 0 | Status: SHIPPED | OUTPATIENT
Start: 2017-04-11 | End: 2017-07-06 | Stop reason: SDUPTHER

## 2017-04-11 NOTE — PROGRESS NOTES
Tell pt his labs show he is getting a little too much thyroid medication. I want him to alternate doses: M-F 125mcg, SaSu 112.   I sent that in for him

## 2017-04-24 ENCOUNTER — HOSPITAL ENCOUNTER (OUTPATIENT)
Dept: PHYSICAL THERAPY | Age: 72
Discharge: HOME | End: 2017-04-24
Payer: MEDICARE

## 2017-04-24 PROCEDURE — 97161 PT EVAL LOW COMPLEX 20 MIN: CPT

## 2017-04-24 PROCEDURE — 97110 THERAPEUTIC EXERCISES: CPT

## 2017-04-24 PROCEDURE — G8984 CARRY CURRENT STATUS: HCPCS

## 2017-04-24 PROCEDURE — G8985 CARRY GOAL STATUS: HCPCS

## 2017-04-24 NOTE — PROGRESS NOTES
PHYSICAL THERAPY - DAILY TREATMENT NOTE    Patient Name: Tamara Waldron        Date: 2017  : 1945   YES Patient  Verified  Visit #:     Insurance: Payor: [place] MEDICARE / Plan: [place] MEDICARE PART A & B / Product Type: Medicare /      In time: 6:06 Out time: 6:41   Total Treatment Time: 35     Medicare Time Tracking (below)   Total Timed Codes (min):  10 1:1 Treatment Time:  10     TREATMENT AREA =  Right shld    SUBJECTIVE    Pain Level (on 0 to 10 scale):  0  / 10   Medication Changes/New allergies or changes in medical history, any new surgeries or procedures? NO    If yes, update Summary List   Subjective Functional Status/Changes:  []  No changes reported     See POC          OBJECTIVE    10 min Therapeutic Exercise:  [x]  See flow sheet   Rationale:      increase ROM, increase strength and reduce pain to improve the patients ability to lift/reach     Other Objective/Functional Measures:    Shown and performed HEP     Post Treatment Pain Level (on 0 to 10) scale:   0 / 10     ASSESSMENT  Assessment/Changes in Function:     See POC     []  See Progress Note/Recertification   Patient will continue to benefit from skilled PT services to modify and progress therapeutic interventions, address functional mobility deficits, address ROM deficits, address strength deficits, analyze and address soft tissue restrictions, analyze and cue movement patterns and analyze and modify body mechanics/ergonomics to attain goals per POC.    Progress toward goals / Updated goals:    See POC     PLAN  [x]  Upgrade activities as tolerated YES Continue plan of care   []  Discharge due to :    []  Other:      Therapist: Rhea Che PT, DPT, OCS, CSCS    Date: 2017 Time: 6:07 PM

## 2017-04-24 NOTE — PROGRESS NOTES
[address] PHYSICAL THERAPY  [address] [place] [address] 51, [address],[facility], [address] [facility] - Phone: [phone]  Fax: [phone] / [facility]  Patient Name: Tomas Hoffman : 1945   Medical   Diagnosis: Right shld bursitis Treatment Diagnosis: Right shoulder pain [M25.511]   Onset Date: approx 2 months ago     Referral Source: Annalee Florez MD [place] [facility]): 2017   Prior Hospitalization: See medical history Provider #: [id number]   Prior Level of Function: Previous no pain with lifting/reaching, sleeping on shoulder   Comorbidities: Hearing impaired   Medications: Verified on Patient Summary List   The Plan of Care and following information is based on the information from the initial evaluation.   ===========================================================================================  Assessment / salcedo information:  Tomas Hoffman is a 66 y.o.  yo male with Dx: right shld pain, who reports initial pain approx 2 months ago after adding pushups to workout routine with dumbells. Pt rates pain as 7/10 max, 0/10 at best, 0/10 today increasing with reaching behind back, overhead and sleeping on shld. Prior treatment includes Advil with some relief and modification of workouts. PMHx: is sig for shld surgery in  after right shld dislocation. Objective: FOTO score = 61 (an established functional score where 100 = no disability). Shoulder AROM: flex right = 131, left = 138; ext right = 56, left = 68; ER right = 65, left = 85; scapt right = 111, left = 151, IR (measured C7 to thumb behind back) right = 46 cm w/pain,  left = 21 cm, 90/90 ER right 45 deg, left 85 deg. Strength is normal for all planes bilat with some pain with resisted scaption right. Special tests: + for Randall-Joel, cross arm, Empty can for pain. Postures does show fwd shld, protracted scap bilat.   Palpation negative with exception of general UT tenderenss bilat. Pt instructed in HEP and will f/u in clinic for PT.  ===========================================================================================  Eval Complexity: History MEDIUM  Complexity : 1-2 comorbidities / personal factors will impact the outcome/ POC ;  Examination  HIGH Complexity : 4+ Standardized tests and measures addressing body structure, function, activity limitation and / or participation in recreation ; Presentation LOW Complexity : Stable, uncomplicated ;  Decision Making MEDIUM Complexity : FOTO score of 26-74; Overall Complexity LOW   Problem List: pain affecting function, decrease ROM, decrease ADL/ functional abilitiies, decrease activity tolerance and decrease flexibility/ joint mobility FOTO = 61  Treatment Plan may include any combination of the following: Therapeutic exercise, Therapeutic activities, Neuromuscular re-education, Physical agent/modality, Manual therapy, Patient education and Self Care training  Patient / Family readiness to learn indicated by: asking questions, trying to perform skills and interest  Persons(s) to be included in education: patient (P)  Barriers to Learning/Limitations: no  Measures taken:    Patient Goal (s): \"back to normal\"   Patient self reported health status: good  Rehabilitation Potential: good   Short Term Goals: To be accomplished in  1-2  weeks:  1. Independent with HEP. 2. Decrease max pain 25-50% to assist with lifting behind back for self care.  Long Term Goals: To be accomplished in  4-6  weeks:  1. Decrease max pain 50-75% to assist with reaching overhead for ADLs. 2.  Improve FOTO Functional Status Score by 10 points in order to show significant functional improvement. 3.  Will rate a +5 on Global Rating of Change and be prepared to DC to HEP.   Frequency / Duration:   Patient to be seen  2-3  times per week for 4-6  weeks:  Patient / Caregiver education and instruction: self care and exercises  G-Codes (GP): Carry [id number] Current  CJ= 20-39%    Goal  CI= 1-19%. The severity rating is based on the FOTO Score    Therapist Signature: Cj Bui PT, DPT, OCS, CSCS Date: 7/50/7067   Certification Period: 4/24/17 - 7/21/17 Time: 6:46 PM   ===========================================================================================  I certify that the above Physical Therapy Services are being furnished while the patient is under my care. I agree with the treatment plan and certify that this therapy is necessary. Physician Signature:        Date:       Time:     Please sign and return to [facility] or you may fax the signed copy to [phone]. Thank you.

## 2017-04-26 ENCOUNTER — IMPORTED ENCOUNTER (OUTPATIENT)
Dept: URBAN - METROPOLITAN AREA CLINIC 1 | Facility: CLINIC | Age: 72
End: 2017-04-26

## 2017-04-26 ENCOUNTER — HOSPITAL ENCOUNTER (OUTPATIENT)
Dept: PHYSICAL THERAPY | Age: 72
Discharge: HOME | End: 2017-04-26
Payer: MEDICARE

## 2017-04-26 PROBLEM — H40.013: Noted: 2017-04-26

## 2017-04-26 PROBLEM — H43.811: Noted: 2017-04-26

## 2017-04-26 PROBLEM — H25.813: Noted: 2017-04-26

## 2017-04-26 PROCEDURE — 92004 COMPRE OPH EXAM NEW PT 1/>: CPT

## 2017-04-26 PROCEDURE — 97110 THERAPEUTIC EXERCISES: CPT

## 2017-04-26 PROCEDURE — 97140 MANUAL THERAPY 1/> REGIONS: CPT

## 2017-04-26 PROCEDURE — 92015 DETERMINE REFRACTIVE STATE: CPT

## 2017-04-26 NOTE — PATIENT DISCUSSION
1.  Cataract OU:  Visually Significant discussed the risks benefits alternatives and limitations of cataract surgery. The patient stated a full understanding and a desire to proceed with the procedure. The patient will need to return for preop appointment with cataract measurements and to have any additional questions answered and start pre-operative eye drops as directed. OS then OD. Patient did not see PMG today Phaco PCLOtherwise follow-up2. PVD w/o Tear OD - Patient was cautioned to call our office immediately if they experience   a substantial change in their symptoms such as an increase in floaters persistent flashes loss of visual field (may appear as a shadow or a curtain) or decrease in visual acuity as these may indicate a retinal tear or detachment. 3.  COAG suspect OU: (0.60/0.55)  IOP 12/11. Condition was discussed with patient. Will monitor patient for progression. Work up after work up 4. Return for an appointment for H and P. with Dr. Roosevelt Root.

## 2017-04-26 NOTE — PROGRESS NOTES
PHYSICAL THERAPY - DAILY TREATMENT NOTE    Patient Name: Yuri Mckeon        Date: 2017  : 1945   YES Patient  Verified  Visit #:     Insurance: Payor: Mima Maurice / Plan: [place] MEDICARE PART A & B / Product Type: Medicare /      In time: 6 Out time: 650   Total Treatment Time: 50     Medicare Time Tracking (below)   Total Timed Codes (min):  50 1:1 Treatment Time:  50     TREATMENT AREA =  Right shoulder pain [M25.511]    SUBJECTIVE  Pain Level (on 0 to 10 scale):  1  / 10   Medication Changes/New allergies or changes in medical history, any new surgeries or procedures? NO    If yes, update Summary List   Subjective Functional Status/Changes:  []  No changes reported     Pain on the side of the shoulder. Takes a while to warm-up, and generally has some spasm-type pain on the (R) side of the neck while warming up. Usually goes away after a few minutes. OBJECTIVE  35 min Therapeutic Exercise:  [x]  See flow sheet   Rationale:      increase ROM, increase strength, improve coordination and improve balance to improve the patients ability to perform pain free ADLs. 15 min Manual Therapy: STM/DTM (R) c/s paraspinals, UT/LS, rhomboids, infraspinatus, teres minor, mid delt. Ap ghj mobs. TPR pec minor. Rationale:      decrease pain, increase ROM, increase tissue extensibility and decrease trigger points to improve patient's ability to perform pain free ADLs. min Patient Education:  YES  Reviewed HEP   []  Progressed/Changed HEP based on: Other Objective/Functional Measures: Added multiple exercises to improve upper trunk mobility and strength for ADLs. Post Treatment Pain Level (on 0 to 10) scale:   0  / 10     ASSESSMENT  Assessment/Changes in Function:     Good tolerance to initial session.        []  See Progress Note/Recertification   Patient will continue to benefit from skilled PT services to modify and progress therapeutic interventions, address functional mobility deficits, address ROM deficits, address strength deficits, analyze and address soft tissue restrictions, analyze and cue movement patterns, analyze and modify body mechanics/ergonomics and assess and modify postural abnormalities to attain remaining goals. Progress toward goals / Updated goals:    Initiated therex.       PLAN  [x]  Upgrade activities as tolerated YES Continue plan of care   []  Discharge due to :    []  Other:      Therapist: Celine Bates PTA    Date: 4/26/2017 Time: 6:33 PM     Future Appointments  Date Time Provider [place]   5/1/2017 5:00 PM Yulia Casarez PT [facility]   5/3/2017 5:00 PM Celine Bates PTA [facility]   5/8/2017 5:00 PM Yulia Casarez, PT [facility]   5/10/2017 5:00 PM Celine Bates PTA [facility]   5/15/2017 5:00 PM Celine Bates PTA [facility]   5/22/2017 6:00 PM Celine Bates PTA [facility]   5/24/2017 5:00 PM Celine Bates PTA [facility]

## 2017-05-01 ENCOUNTER — HOSPITAL ENCOUNTER (OUTPATIENT)
Dept: PHYSICAL THERAPY | Age: 72
Discharge: HOME | End: 2017-05-01
Payer: MEDICARE

## 2017-05-01 PROCEDURE — 97140 MANUAL THERAPY 1/> REGIONS: CPT

## 2017-05-01 NOTE — PROGRESS NOTES
PHYSICAL THERAPY - DAILY TREATMENT NOTE    Patient Name: Jennifer Waters        Date: 2017  : 1945   YES Patient  Verified  Visit #:   3   of   18  Insurance: Payor: [place] MED[employer]ARE / Plan: [place] MEDICARE PART A & B / Product Type: Medicare /      In time: 5:12 Out time: 5:44   Total Treatment Time: 32     Medicare Time Tracking (below)   Total Timed Codes (min):  10 1:1 Treatment Time:  10     TREATMENT AREA =  Right shoulder pain [M25.511]    SUBJECTIVE  Pain Level (on 0 to 10 scale):  0  / 10   Medication Changes/New allergies or changes in medical history, any new surgeries or procedures? NO    If yes, update Summary List   Subjective Functional Status/Changes:  []  No changes reported     [place] good on Saturday with improved ability to sleep on right side; still some diff/pain with reaching overhead and behind back; indep with HEP          OBJECTIVE    10 Min Manual Therapy: DTM pec minor; [address] mob post,inf; PROM/stretch all planes   Rationale:      decrease pain, increase ROM and increase tissue extensibility to improve patient's ability to lift/reach    22 min Therapeutic Exercise:  [x]  See flow sheet   Rationale:      increase ROM and increase strength to improve the patients ability to lift/reach      min Patient Education:  YES  Reviewed HEP   []  Progressed/Changed HEP based on: Other Objective/Functional Measures: Added SL ER with good danial  Held UBE due to patient being 12 min late     Post Treatment Pain Level (on 0 to 10) scale:   2  / 10     ASSESSMENT  Assessment/Changes in Function:     Good danial to all Rx  Cont tightness/reduced ROM with IR behind back     []  See Progress Note/Recertification   Patient will continue to benefit from skilled PT services to modify and progress therapeutic interventions, address functional mobility deficits, address ROM deficits, address strength deficits and analyze and address soft tissue restrictions to attain remaining goals.    Progress toward goals / Updated goals:    STG 2 progressing, improved pain levels, still tight behind back     PLAN  [x]  Upgrade activities as tolerated YES Continue plan of care   []  Discharge due to :    []  Other:      Therapist: Samia Patterson PT, DPT, OCS, CSCS    Date: 5/1/2017 Time: 5:28 PM     Future Appointments  Date Time Provider Sol Pompa   5/3/2017 5:00 PM Madelyne Peabody, PTA [facility]   5/8/2017 5:00 PM Loren Méndez PT [facility]   5/10/2017 5:00 PM Madelyne Peabody, PTA [facility]   5/15/2017 5:00 PM Madelyne Peabody, PTA [facility]   5/22/2017 6:00 PM Madelyne Peabody, PTA [facility]   5/24/2017 5:00 PM Madelyne Peabody, [facility]

## 2017-05-03 ENCOUNTER — HOSPITAL ENCOUNTER (OUTPATIENT)
Dept: PHYSICAL THERAPY | Age: 72
Discharge: HOME | End: 2017-05-03
Payer: MEDICARE

## 2017-05-03 PROCEDURE — 97110 THERAPEUTIC EXERCISES: CPT

## 2017-05-03 PROCEDURE — 97140 MANUAL THERAPY 1/> REGIONS: CPT

## 2017-05-03 NOTE — PROGRESS NOTES
PHYSICAL THERAPY - DAILY TREATMENT NOTE    Patient Name: Sandra Orourke        Date: 5/3/2017  : 1945   YES Patient  Verified  Visit #:     Insurance: Payor: Eva Bliss / Plan: [place] MEDICARE PART A & B / Product Type: Medicare /      In time: 450 Out time: 545   Total Treatment Time: 55     Medicare Time Tracking (below)   Total Timed Codes (min):  55 1:1 Treatment Time:  30     TREATMENT AREA =  Right shoulder pain [M25.511]    SUBJECTIVE  Pain Level (on 0 to 10 scale):    / 10   Medication Changes/New allergies or changes in medical history, any new surgeries or procedures? NO    If yes, update Summary List   Subjective Functional Status/Changes:  []  No changes reported     Had some soreness after the last session. States getting the shoulder pressed on hurt, but felt better the next day. OBJECTIVE    40 (15) min Therapeutic Exercise:  [x]  See flow sheet   Rationale:      increase ROM, increase strength and improve coordination to improve the patients ability to perform pain free ADLs. 15 Min Manual Therapy: STM/DTM (R) periscapular mms, UT/Scalenes, and mid delt. (R) shoulder PROM. Rationale:      decrease pain, increase ROM, increase tissue extensibility and decrease trigger points to improve patient's ability to perform pain free ADLs. min Patient Education:  YES  Reviewed HEP   []  Progressed/Changed HEP based on: Other Objective/Functional Measures:    (R) shoulder PROM flexion ~145 deg prior to pain/soft tissue limitation. (I) with therex. Post Treatment Pain Level (on 0 to 10) scale:   1  / 10     ASSESSMENT  Assessment/Changes in Function:     Good tolerance to therex. TTP @ (R) UT/Scalenes.        []  See Progress Note/Recertification   Patient will continue to benefit from skilled PT services to modify and progress therapeutic interventions, address functional mobility deficits, address ROM deficits, address strength deficits, analyze and address soft tissue restrictions, analyze and cue movement patterns, analyze and modify body mechanics/ergonomics and assess and modify postural abnormalities to attain remaining goals. Progress toward goals / Updated goals:    No change in progress toward LTG's with today's session.       PLAN  [x]  Upgrade activities as tolerated YES Continue plan of care   []  Discharge due to :    []  Other:      Therapist: Светлана Allen PTA    Date: 5/3/2017 Time: 5:06 PM     Future Appointments  Date Time Provider Sol Pompa   5/8/2017 5:00 PM Angela Chacko, PT [facility]   5/10/2017 5:00 PM Светлана Allen PTA [facility]   5/15/2017 5:00 PM Светлана Allen PTA [facility]   5/22/2017 6:00 PM Светлана Allen PTA [facility]   5/24/2017 5:00 PM Светлана Allen PTA [facility]

## 2017-05-08 ENCOUNTER — HOSPITAL ENCOUNTER (OUTPATIENT)
Dept: PHYSICAL THERAPY | Age: 72
Discharge: HOME | End: 2017-05-08
Payer: MEDICARE

## 2017-05-08 PROCEDURE — 97110 THERAPEUTIC EXERCISES: CPT

## 2017-05-08 PROCEDURE — 97140 MANUAL THERAPY 1/> REGIONS: CPT

## 2017-05-08 NOTE — PROGRESS NOTES
PHYSICAL THERAPY - DAILY TREATMENT NOTE    Patient Name: Phillip Rodriguez        Date: 2017  : 1945   YES Patient  Verified  Visit #:      18  Insurance: Payor: Rojelio Meek / Plan: [place] MEDICARE PART A & B / Product Type: Medicare /      In time: 4:50 Out time: 5:41   Total Treatment Time: 51     Medicare Time Tracking (below)   Total Timed Codes (min):  51 1:1 Treatment Time:  40     TREATMENT AREA =  Right shoulder pain [M25.511]    SUBJECTIVE  Pain Level (on 0 to 10 scale):  0  / 10   Medication Changes/New allergies or changes in medical history, any new surgeries or procedures? NO    If yes, update Summary List   Subjective Functional Status/Changes:  []  No changes reported     Still some difficulty with lifting behind back but not as bad as it was          OBJECTIVE    8 Min Manual Therapy: [address] mob post/inf; DTM pec minor; PROM/stretch all planes   Rationale:      decrease pain, increase ROM and increase tissue extensibility to improve patient's ability to lift/reach    43 min Therapeutic Exercise:  [x]  See flow sheet   Rationale:      increase ROM and increase strength to improve the patients ability to lift/reach      min Patient Education:  YES  Reviewed HEP   []  Progressed/Changed HEP based on: Other Objective/Functional Measures:    IR behind back 38cm (was 46 cm on IE, showing 8 cm improvement)       Post Treatment Pain Level (on 0 to 10) scale:   1-2  / 10     ASSESSMENT  Assessment/Changes in Function:     Some improvement with IR behind back noted  Good danial to exs/Rx     []  See Progress Note/Recertification   Patient will continue to benefit from skilled PT services to modify and progress therapeutic interventions, address functional mobility deficits, address ROM deficits, address strength deficits and analyze and address soft tissue restrictions to attain remaining goals.    Progress toward goals / Updated goals:    FOTO next Rx     PLAN  [x]  Upgrade activities as tolerated YES Continue plan of care   []  Discharge due to :    []  Other:      Therapist: Janet Mason PT, DPT, OCS, CSCS    Date: 5/8/2017 Time: 4:53 PM     Future Appointments  Date Time Provider Sol Pompa   5/8/2017 5:00 PM Georgie Andrade PT [facility]   5/10/2017 5:00 PM Nicole Juárez PTA [facility]   5/15/2017 5:00 PM Nicole Juárez PTA [facility]   5/22/2017 6:00 PM Nicole Juárez PTA [facility]   5/24/2017 5:00 PM Nicole Juárez [facility]

## 2017-05-10 ENCOUNTER — HOSPITAL ENCOUNTER (OUTPATIENT)
Dept: PHYSICAL THERAPY | Age: 72
Discharge: HOME | End: 2017-05-10
Payer: MEDICARE

## 2017-05-10 PROCEDURE — 97110 THERAPEUTIC EXERCISES: CPT

## 2017-05-10 PROCEDURE — 97140 MANUAL THERAPY 1/> REGIONS: CPT

## 2017-05-10 NOTE — PROGRESS NOTES
PHYSICAL THERAPY - DAILY TREATMENT NOTE    Patient Name: Elo Art        Date: 5/10/2017  : 1945   YES Patient  Verified  Visit #:     Insurance: Payor: Miriam Ramires / Plan: [place] MEDICARE PART A & B / Product Type: Medicare /      In time: 455 Out time: 550   Total Treatment Time: 55     Medicare Time Tracking (below)   Total Timed Codes (min):  55 1:1 Treatment Time:  40     TREATMENT AREA =  Right shoulder pain [M25.511]    SUBJECTIVE  Pain Level (on 0 to 10 scale): 0  / 10   Medication Changes/New allergies or changes in medical history, any new surgeries or procedures? NO    If yes, update Summary List   Subjective Functional Status/Changes:  []  No changes reported     Pain on the outside of the shoulder. Hurts when reaching overhead. Played some guitar the other day, felt fine. Does spend a lot of time on the computer currently, and spent many years playing guitar. OBJECTIVE  40 (25)  min Therapeutic Exercise:  [x]  See flow sheet   Rationale:      increase ROM, increase strength and improve coordination to improve the patients ability to perform pain free ADLs. 15 Min Manual Therapy: STM/DTM (R) periscapular mms, UT/LS, infraspinatus, mid deltoid. Rationale:      decrease pain, increase ROM, increase tissue extensibility and decrease trigger points to improve patient's ability to perform pain free ADLs. min Patient Education:  YES  Reviewed HEP   []  Progressed/Changed HEP based on: Other Objective/Functional Measures: Added standing flexion/scaption and prone horizontal abduction to improve UE strength and stability for ADLs. FOTO = 67      Post Treatment Pain Level (on 0 to 10) scale:   0   / 10     ASSESSMENT  Assessment/Changes in Function:     No exacerbation of symptoms with today's session.       []  See Progress Note/Recertification   Patient will continue to benefit from skilled PT services to modify and progress therapeutic interventions, address functional mobility deficits, address ROM deficits, address strength deficits, analyze and address soft tissue restrictions, analyze and cue movement patterns, analyze and modify body mechanics/ergonomics and assess and modify postural abnormalities to attain remaining goals. Progress toward goals / Updated goals:    6 point improvement in FOTO score. Progressing toward LTG #2.       PLAN  [x]  Upgrade activities as tolerated YES Continue plan of care   []  Discharge due to :    []  Other:      Therapist: Albina Rojas PTA    Date: 5/10/2017 Time: 5:40 PM     Future Appointments  Date Time Provider Sol Pompa   5/15/2017 5:00 PM Albina Rojas PTA [facility]   5/22/2017 6:00 PM Albina Rojas, [facility]   5/24/2017 5:00 PM Albina Rojas, [facility]

## 2017-05-15 ENCOUNTER — HOSPITAL ENCOUNTER (OUTPATIENT)
Dept: PHYSICAL THERAPY | Age: 72
Discharge: HOME | End: 2017-05-15
Payer: MEDICARE

## 2017-05-15 PROCEDURE — 97110 THERAPEUTIC EXERCISES: CPT

## 2017-05-15 PROCEDURE — 97140 MANUAL THERAPY 1/> REGIONS: CPT

## 2017-05-15 NOTE — PROGRESS NOTES
PHYSICAL THERAPY - DAILY TREATMENT NOTE    Patient Name: Natividad Diallo        Date: 5/15/2017  : 1945   YES Patient  Verified  Visit #:     Insurance: Payor: [place] MEDICARE / Plan: VA MEDICARE PART A & B / Product Type: Medicare /      In time: 5 Out time: 6   Total Treatment Time: 60     Medicare Time Tracking (below)   Total Timed Codes (min):  60 1:1 Treatment Time:  45     TREATMENT AREA =  Right shoulder pain [M25.511]    SUBJECTIVE  Pain Level (on 0 to 10 scale):  0   / 10   Medication Changes/New allergies or changes in medical history, any new surgeries or procedures? NO    If yes, update Summary List   Subjective Functional Status/Changes:  []  No changes reported     Pain on the side of the shoulder. Was busy over the weekend and didn't get a chance to do many of the exercises. OBJECTIVE  45 (30) min Therapeutic Exercise:  [x]  See flow sheet   Rationale:      increase ROM, increase strength and improve coordination to improve the patients ability to perform pain free ADLs. 15 Min Manual Therapy: (R) shoulder PROM. TPR (R) pec, UT/LS, mid delt, and post delt/teres minor. .    Rationale:      decrease pain, increase ROM, increase tissue extensibility and decrease trigger points to improve patient's ability to perform pain free overhead ADLs. min Patient Education:  YES  Reviewed HEP   []  Progressed/Changed HEP based on: Other Objective/Functional Measures: Therex per flow sheet. Added posterior capsule stretch to improve shoulder ROM for ADLs. Post Treatment Pain Level (on 0 to 10) scale:   0   / 10     ASSESSMENT  Assessment/Changes in Function:     Limited ROM secondary to pain. Tender in (R) pec, mid/post delt.        []  See Progress Note/Recertification   Patient will continue to benefit from skilled PT services to modify and progress therapeutic interventions, address functional mobility deficits, address ROM deficits, address strength deficits, analyze and address soft tissue restrictions, analyze and cue movement patterns, analyze and modify body mechanics/ergonomics and assess and modify postural abnormalities to attain remaining goals. Progress toward goals / Updated goals:    No change in progress toward LTG's with today's session.       PLAN  [x]  Upgrade activities as tolerated YES Continue plan of care   []  Discharge due to :    []  Other:      Therapist: Miguel Garber PTA    Date: 5/15/2017 Time: 7:05 PM     Future Appointments  Date Time Provider Sol Pompa   5/22/2017 6:00 PM James Daniel [facility]   5/24/2017 5:00 PM Miguel Garber PTA [facility]

## 2017-05-17 ENCOUNTER — HOSPITAL ENCOUNTER (OUTPATIENT)
Dept: PHYSICAL THERAPY | Age: 72
Discharge: HOME | End: 2017-05-17
Payer: MEDICARE

## 2017-05-17 PROCEDURE — 97140 MANUAL THERAPY 1/> REGIONS: CPT

## 2017-05-17 PROCEDURE — 97110 THERAPEUTIC EXERCISES: CPT

## 2017-05-17 NOTE — PROGRESS NOTES
PHYSICAL THERAPY - DAILY TREATMENT NOTE    Patient Name: Gaviota Mercado        Date: 2017  : 1945   YES Patient  Verified  Visit #:     Insurance: Payor: Tad Sellers / Plan: [place] MEDICARE PART A & B / Product Type: Medicare /      In time: 5 Out time: 555   Total Treatment Time: 55     Medicare Time Tracking (below)   Total Timed Codes (min):  55 1:1 Treatment Time:  40     TREATMENT AREA =  Right shoulder pain [M25.511]    SUBJECTIVE  Pain Level (on 0 to 10 scale):  0  / 10   Medication Changes/New allergies or changes in medical history, any new surgeries or procedures? NO    If yes, update Summary List   Subjective Functional Status/Changes:  []  No changes reported     Having a lot of soreness in the side of the shoulder today. OBJECTIVE  40 (25)  min Therapeutic Exercise:  [x]  See flow sheet   Rationale:      increase ROM, increase strength and improve coordination to improve the patients ability to reach overhead. 15 min Manual Therapy: (R) shoulder PROM. TPR (R) periscapular mms, t/s paraspinals. Rationale:      decrease pain, increase ROM, increase tissue extensibility and decrease trigger points to improve patient's ability to perform pain free ADLs. min Patient Education:  YES  Reviewed HEP   []  Progressed/Changed HEP based on: Other Objective/Functional Measures: Therex per flow sheet. Post Treatment Pain Level (on 0 to 10) scale:   0  / 10     ASSESSMENT  Assessment/Changes in Function:     Very tender along (R) t/s paraspinals. ROM limited secondary to pain.       []  See Progress Note/Recertification   Patient will continue to benefit from skilled PT services to modify and progress therapeutic interventions, address functional mobility deficits, address ROM deficits, address strength deficits, analyze and address soft tissue restrictions, analyze and cue movement patterns, analyze and modify body mechanics/ergonomics and assess and modify postural abnormalities to attain remaining goals. Progress toward goals / Updated goals:    No change in progress toward LTG's with today's session.       PLAN  [x]  Upgrade activities as tolerated YES Continue plan of care   []  Discharge due to :    []  Other:      Therapist: Sherley Kayser, PTA    Date: 5/17/2017 Time: 6:19 PM     Future Appointments  Date Time Provider Sol Pompa   5/22/2017 6:00 PM Sherley Kayser, [facility]   5/24/2017 5:00 PM Sherley Kayser, PTA [facility]

## 2017-05-22 ENCOUNTER — HOSPITAL ENCOUNTER (OUTPATIENT)
Dept: PHYSICAL THERAPY | Age: 72
Discharge: HOME | End: 2017-05-22
Payer: MEDICARE

## 2017-05-22 PROCEDURE — 97140 MANUAL THERAPY 1/> REGIONS: CPT

## 2017-05-22 PROCEDURE — 97110 THERAPEUTIC EXERCISES: CPT

## 2017-05-22 NOTE — PROGRESS NOTES
PHYSICAL THERAPY - DAILY TREATMENT NOTE    Patient Name: Marissa Scott        Date: 2017  : 1945   YES Patient  Verified  Visit #:     Insurance: Payor: Nelly Shake / Plan: [place] MEDICARE PART A & B / Product Type: Medicare /      In time: 421 Out time: 650   Total Treatment Time: 55     Medicare Time Tracking (below)   Total Timed Codes (min):  55 1:1 Treatment Time:  40     TREATMENT AREA =  Right shoulder pain [M25.511]    SUBJECTIVE  Pain Level (on 0 to 10 scale):    / 10   Medication Changes/New allergies or changes in medical history, any new surgeries or procedures? NO    If yes, update Summary List   Subjective Functional Status/Changes:  []  No changes reported     No so much pain, just an awareness that something is going on in the (R) shoulder. OBJECTIVE  40 (25)  min Therapeutic Exercise:  [x]  See flow sheet   Rationale:      increase ROM, increase strength, improve coordination and improve balance to improve the patients ability to perform pain free ADLs. 15 Min Manual Therapy: (R) shoulder PROM. TPR (R) periscapular mms. Assisted scapular upward rotation. Rationale:      decrease pain, increase ROM, increase tissue extensibility and decrease trigger points to improve patient's ability to reach overhead. min Patient Education:  YES  Reviewed HEP   []  Progressed/Changed HEP based on: Other Objective/Functional Measures: Therex per flow sheet. Decrease in pain reported with overhead activities during assisted scapular upward rotation. Added Wall slides w/ foam roll and serratus punch to improve serratus anterior/lower trap strength. Post Treatment Pain Level (on 0 to 10) scale:   1-2   / 10     ASSESSMENT  Assessment/Changes in Function:     Pt demonstrates (I) with therex. Continued pain across mid delt/supraspinatus during overhead motions.       []  See Progress Note/Recertification   Patient will continue to benefit from skilled PT services to modify and progress therapeutic interventions, address functional mobility deficits, address ROM deficits, address strength deficits, analyze and address soft tissue restrictions, analyze and cue movement patterns, analyze and modify body mechanics/ergonomics and assess and modify postural abnormalities to attain remaining goals. Progress toward goals / Updated goals:    No change in progress toward LTG's with today's session.       PLAN  [x]  Upgrade activities as tolerated YES Continue plan of care   []  Discharge due to :    []  Other:      Therapist: Joe Snyder PTA    Date: 5/22/2017 Time: 6:26 PM     Future Appointments  Date Time Provider Sol Pompa   5/24/2017 5:00 PM Joe Snyder PTA [facility]

## 2017-05-24 ENCOUNTER — HOSPITAL ENCOUNTER (OUTPATIENT)
Dept: PHYSICAL THERAPY | Age: 72
Discharge: HOME | End: 2017-05-24
Payer: MEDICARE

## 2017-05-24 PROCEDURE — 97110 THERAPEUTIC EXERCISES: CPT

## 2017-05-24 PROCEDURE — G8985 CARRY GOAL STATUS: HCPCS

## 2017-05-24 PROCEDURE — 97140 MANUAL THERAPY 1/> REGIONS: CPT

## 2017-05-24 PROCEDURE — G8984 CARRY CURRENT STATUS: HCPCS

## 2017-05-24 NOTE — PROGRESS NOTES
PHYSICAL THERAPY - DAILY TREATMENT NOTE    Patient Name: Raquel Armstrong        Date: 2017  : 1945   YES Patient  Verified  Visit #:   10   of   12  Insurance: Payor: Ty Herring / Plan: [place] MEDICARE PART A & B / Product Type: Medicare /      In time: 5 Out time: 550   Total Treatment Time: 50     Medicare Time Tracking (below)   Total Timed Codes (min):  50 1:1 Treatment Time:  40     TREATMENT AREA =  Right shoulder pain [M25.511]    SUBJECTIVE  Pain Level (on 0 to 10 scale):  0  / 10   Medication Changes/New allergies or changes in medical history, any new surgeries or procedures? NO    If yes, update Summary List   Subjective Functional Status/Changes:  []  No changes reported     No pain today. Used a big exercise ball to do the shoulder exercises at home. Sleeping on the (R) shoulder with ease now. Can retrieve wallet from (R) rear pocket with (R) UE without as much pain now. Feels like he is reachjing higher w/o pain now. Recent max pain 6-7/10, but subsides quickly after making a \"wrong\" movement. OBJECTIVE    35(25) min Therapeutic Exercise:  [x]  See flow sheet   Rationale:      increase ROM, increase strength and improve coordination to improve the patients ability to perform overhead ADLs. 15 Min Manual Therapy: STM/DTM (R) periscapular mms, (B) c/s paraspinals. SOR. Rationale:      decrease pain, increase ROM, increase tissue extensibility and decrease trigger points to improve patient's ability to perform pain free overhead adls. min Patient Education:  YES  Reviewed HEP   []  Progressed/Changed HEP based on: Other Objective/Functional Measures:    Assisted scapular upward rotation during scaption = increased overhead range.    AAROM scaption = 156 deg  AAROM scaption w/ assisted scap up rot = 163 deg     Post Treatment Pain Level (on 0 to 10) scale:   1-2  / 10     ASSESSMENT  Assessment/Changes in Function:     See PN     []  See Progress Note/Recertification   Patient will continue to benefit from skilled PT services to modify and progress therapeutic interventions, address functional mobility deficits, address ROM deficits, address strength deficits, analyze and address soft tissue restrictions, analyze and cue movement patterns, analyze and modify body mechanics/ergonomics and assess and modify postural abnormalities to attain remaining goals. Progress toward goals / Updated goals:    See PN     PLAN  [x]  Upgrade activities as tolerated YES Continue plan of care   []  Discharge due to :    []  Other:      Therapist: Miguel Garber PTA    Date: 5/24/2017 Time: 5:05 PM     No future appointments.

## 2017-05-25 NOTE — PROGRESS NOTES
[facility] PHYSICAL THERAPY  [address] [place] [address] 201,[facility], 70 [facility] - Phone: [phone]  Fax: [phone]  [place]          Patient Name: Gaby Lopez : 1945   Treatment/Medical Diagnosis: Right shoulder pain [M25.511]   Onset Date: approx 2 months prior to IE     Referral Source: Jay Berry MD Start of Care [facility]): 2017   Prior Hospitalization: See Medical History Provider #: [id number]   Prior Level of Function: Previous no pain with lifting/reaching, sleeping on shoulder    Comorbidities: Hearing impaired   Medications: Verified on Patient Summary List   Visits from [facility]: 10 Missed Visits: 0     Goal/Measure of Progress Goal Met? 1. Decrease max pain 50-75% to assist with reaching overhead for ADLs. Status at last Eval: 7/10 Current Status: 7/10  no   2. Improve FOTO Functional Status Score by 10 points in order to show significant functional improvement. Status at last Eval: 61 Current Status: 67 progressing   3. Will rate a +5 on Global Rating of Change and be prepared to DC to HEP. Status at last Eval: na Current Status: na n/a     Key Functional Changes/Progress: Pt presented to [facility] w/ c/o (R) shoulder pain. Recent max pain reported as 7/10, however daily reported pain is between 0-2/10. Current (R) shoulder AROM scaption = 156 deg, 163 deg w/ assisted scapular upward rotation. Pt is (I) and compliant with HEP exercises.     Problem List: pain affecting function, decrease ROM, decrease strength, decrease ADL/ functional abilitiies, decrease activity tolerance and decrease flexibility/ joint mobility   Treatment Plan may include any combination of the following: Therapeutic exercise, Therapeutic activities, Neuromuscular re-education, Physical agent/modality, Manual therapy, Patient education, Self Care training and Functional mobility training  Patient Goal(s) has been updated and includes:      Goals for this certification period include and are to be achieved in   4  weeks:  1. Decrease max pain 50-75% to assist with reaching overhead for ADLs. 2. Improve FOTO Functional Status Score to >/=71 points in order to show significant functional improvement. 3. Will rate a +5 on Global Rating of Change and be prepared to DC to HEP. 4. Pt will demonstrate (R) shoulder AROM scaption of >/= 170 deg to indicate improved ability to perform overhead ADLs. Frequency / Duration:   Patient to be seen   2   times per week for   4    weeks:  G-Codes (GP): Carry [id number] Current  CJ= 20-39%    Goal  CI= 1-19%. The severity rating is based on the FOTO Score    Assessments/Recommendations: Pt to continue 1-2x weekly for an additional 2 weeks to establish long-term HEP, improve overhead motion, and be (I) with HEP exercises in preparation for DC. Thank you for this referral.   If you have any questions/comments please contact us directly at [phone]. Thank you for allowing us to assist in the care of your patient. Therapist Signature: Kam Boswell PTA/Jose Guadalupe Cabrera PT, DPT, OCS, CSCS Date: 1/83/0252   Certification Period:  Reporting Period: 4/24/2017 - 7/21/2017 4/24/2017 - 5/24/2017 Time: 6:08 PM   NOTE TO PHYSICIAN:  PLEASE COMPLETE THE ORDERS BELOW AND FAX TO   [facility]: [phone]  If you are unable to process this request in 24 hours please contact our office: [phone]    ___ I have read the above report and request that my patient continue as recommended.   ___ I have read the above report and request that my patient continue therapy with the following changes/special instructions: ________________________________________________   ___ I have read the above report and request that my patient be discharged from therapy.      Physician Signature:        Date:       Time:

## 2017-05-31 ENCOUNTER — HOSPITAL ENCOUNTER (OUTPATIENT)
Dept: PHYSICAL THERAPY | Age: 72
Discharge: HOME | End: 2017-05-31
Payer: MEDICARE

## 2017-05-31 PROCEDURE — 97110 THERAPEUTIC EXERCISES: CPT

## 2017-05-31 PROCEDURE — 97140 MANUAL THERAPY 1/> REGIONS: CPT

## 2017-05-31 NOTE — PROGRESS NOTES
PHYSICAL THERAPY - DAILY TREATMENT NOTE    Patient Name: Dave Carver        Date: 2017  : 1945   YES Patient  Verified  Visit #:     Insurance: Payor: Addie Host / Plan: [place] MED[place]ARE PART A & B / Product Type: Medicare /      In time: 530 Out time: 610   Total Treatment Time: 40     Medicare Time Tracking (below)   Total Timed Codes (min):  40 1:1 Treatment Time:  40     TREATMENT AREA =  Right shoulder pain [M25.511]    SUBJECTIVE  Pain Level (on 0 to 10 scale):  0  / 10   Medication Changes/New allergies or changes in medical history, any new surgeries or procedures? NO    If yes, update Summary List   Subjective Functional Status/Changes:  []  No changes reported     Says he has felt really lately until he reached for something in the car earlier today - had 10' of the shooting pain in the shoulder. OBJECTIVE    25 min Therapeutic Exercise:  [x]  See flow sheet   Rationale:      increase ROM, increase strength, improve coordination and improve balance to improve the patients ability to perform pain free ADLs. 15 Min Manual Therapy: STM/DTM (R) periscapular mms. Rationale:      decrease pain, increase ROM, increase tissue extensibility and decrease trigger points to improve patient's ability to perform pain free overhead activities. min Patient Education:  YES  Reviewed HEP   []  Progressed/Changed HEP based on: Other Objective/Functional Measures: Therex per flow sheet. Post Treatment Pain Level (on 0 to 10) scale:   1-2  / 10     ASSESSMENT  Assessment/Changes in Function:     No exacerbation of symptoms with today's session.       []  See Progress Note/Recertification   Patient will continue to benefit from skilled PT services to modify and progress therapeutic interventions, address functional mobility deficits, address ROM deficits, address strength deficits, analyze and address soft tissue restrictions, analyze and cue movement patterns, analyze and modify body mechanics/ergonomics and assess and modify postural abnormalities to attain remaining goals. Progress toward goals / Updated goals:    No change in progress toward LTG's with today's session.       PLAN  [x]  Upgrade activities as tolerated YES Continue plan of care   []  Discharge due to :    []  Other:      Therapist: Varinder Aviles PTA    Date: 5/31/2017 Time: 6:12 PM     Future Appointments  Date Time Provider Sol Pompa   6/7/2017 5:00 PM Varinder Aviles PTA [facility]

## 2017-06-05 ENCOUNTER — IMPORTED ENCOUNTER (OUTPATIENT)
Dept: URBAN - METROPOLITAN AREA CLINIC 1 | Facility: CLINIC | Age: 72
End: 2017-06-05

## 2017-06-05 PROBLEM — H40.013: Noted: 2017-06-05

## 2017-06-05 PROBLEM — H25.813: Noted: 2017-06-05

## 2017-06-05 PROBLEM — H43.811: Noted: 2017-06-05

## 2017-06-05 PROCEDURE — 92136 OPHTHALMIC BIOMETRY: CPT

## 2017-06-05 NOTE — PATIENT DISCUSSION
1.  Cataract OU:  Visually Significant secondary to glare discussed the risks benefits alternatives and limitations of cataract surgery. The patient stated a full understanding and a desire to proceed with the procedure. The patient will need to start pre-operative eye drops as directed. Proceed w/ phaco PCL OS then OD Pt understands they will need glasses post-op to achieve their best corrected vision. 2.  Glaucoma Suspect OU (0.7/0.55): Stable IOP OU. Baseline w/u after phaco. Patient is considered Low Risk. Condition was discussed with patient and patient understands. Will continue to monitor patient for any progression in condition. Patient was advised to call us with any problems questions or concerns. 3.  PVD w/o Tear OD - RD precautions.

## 2017-06-07 ENCOUNTER — HOSPITAL ENCOUNTER (OUTPATIENT)
Dept: PHYSICAL THERAPY | Age: 72
Discharge: HOME | End: 2017-06-07
Payer: MEDICARE

## 2017-06-07 PROCEDURE — 97110 THERAPEUTIC EXERCISES: CPT

## 2017-06-07 PROCEDURE — 97140 MANUAL THERAPY 1/> REGIONS: CPT

## 2017-06-07 NOTE — PROGRESS NOTES
PHYSICAL THERAPY - DAILY TREATMENT NOTE    Patient Name: Kailey Hennessy        Date: 2017  : 1945   YES Patient  Verified  Visit #:     Insurance: Payor: Lance Postin / Plan: [place] MEDICARE PART A & B / Product Type: Medicare /      In time: 547 Out time: 545   Total Treatment Time: 50     Medicare Time Tracking (below)   Total Timed Codes (min):  50 1:1 Treatment Time:  40     TREATMENT AREA =  Right shoulder pain [M25.511]    SUBJECTIVE  Pain Level (on 0 to 10 scale):  0  / 10   Medication Changes/New allergies or changes in medical history, any new surgeries or procedures? NO    If yes, update Summary List   Subjective Functional Status/Changes:  []  No changes reported     Pt states he hasn't had that usual pain he gets when reaching for something. OBJECTIVE  40 (30) min Therapeutic Exercise:  [x]  See flow sheet   Rationale:      increase ROM, increase strength and improve coordination to improve the patients ability to perform pain free ADLs. 10 Min Manual Therapy: (R) shoulder PROM. STM/DTM and TPR to (R) periscapular mms, t/s paraspinals. Rationale:      decrease pain, increase ROM, increase tissue extensibility and decrease trigger points to improve patient's ability to perform pain free ADLs. min Patient Education:  YES  Reviewed HEP   []  Progressed/Changed HEP based on: Other Objective/Functional Measures: Therex per flow sheet. FOTO = 62      Post Treatment Pain Level (on 0 to 10) scale:   0   / 10     ASSESSMENT  Assessment/Changes in Function:     Multiple trigger points in (B) ts paraspinals.   (R) shoulder flexion ROM approximately 160 deg     []  See Progress Note/Recertification   Patient will continue to benefit from skilled PT services to modify and progress therapeutic interventions, address functional mobility deficits, address ROM deficits, address strength deficits, analyze and address soft tissue restrictions, analyze and cue movement patterns, analyze and modify body mechanics/ergonomics and assess and modify postural abnormalities to attain remaining goals. Progress toward goals / Updated goals:    No change in progress toward LTG's with today's session.       PLAN  [x]  Upgrade activities as tolerated YES Continue plan of care   []  Discharge due to :    []  Other:      Therapist: Magdalena Altman PTA    Date: 6/7/2017 Time: 6:35 PM     Future Appointments  Date Time Provider Sol Pompa   6/19/2017 9:00 AM ESTELA Culp [facility]

## 2017-06-14 ENCOUNTER — IMPORTED ENCOUNTER (OUTPATIENT)
Dept: URBAN - METROPOLITAN AREA CLINIC 1 | Facility: CLINIC | Age: 72
End: 2017-06-14

## 2017-06-14 PROBLEM — H25.812 COMBINED FORM OF SENILE CATARACT OF LEFT EYE: Status: RESOLVED | Noted: 2017-06-14 | Resolved: 2017-06-14

## 2017-06-14 PROBLEM — H25.812 COMBINED FORM OF SENILE CATARACT OF LEFT EYE: Status: ACTIVE | Noted: 2017-06-14

## 2017-06-15 ENCOUNTER — IMPORTED ENCOUNTER (OUTPATIENT)
Dept: URBAN - METROPOLITAN AREA CLINIC 1 | Facility: CLINIC | Age: 72
End: 2017-06-15

## 2017-06-15 PROBLEM — Z96.1: Noted: 2017-06-15

## 2017-06-15 PROCEDURE — 99024 POSTOP FOLLOW-UP VISIT: CPT

## 2017-06-15 NOTE — PATIENT DISCUSSION
POD#1 CE/IOL Standard OS doing well. Continue all 3 gtts as prescribed and until gone. Ocuflox TIDLotemax BIDProlensa QDPost op Warnings Reiterated RTC as scheduled

## 2017-06-19 ENCOUNTER — HOSPITAL ENCOUNTER (OUTPATIENT)
Dept: PHYSICAL THERAPY | Age: 72
Discharge: HOME | End: 2017-06-19
Payer: MEDICARE

## 2017-06-19 PROCEDURE — 97140 MANUAL THERAPY 1/> REGIONS: CPT

## 2017-06-19 PROCEDURE — 97110 THERAPEUTIC EXERCISES: CPT

## 2017-06-19 NOTE — PROGRESS NOTES
PHYSICAL THERAPY - DAILY TREATMENT NOTE    Patient Name: Florentino Chirinos        Date: 2017  : 1945   yes Patient  Verified  Visit #:   15   of   20  Insurance: Payor: Brandon Car / Plan: [place] MEDICARE PART A & B / Product Type: Medicare /      In time: 9:00 Out time: 9:48   Total Treatment Time: 48     Medicare Time Tracking (below)   Total Timed Codes (min):  48 1:1 Treatment Time:  38     TREATMENT AREA =  Right shoulder pain [M25.511]    SUBJECTIVE  Pain Level (on 0 to 10 scale):  1  / 10   Medication Changes/New allergies or changes in medical history, any new surgeries or procedures?    no  If yes, update Summary List   Subjective Functional Status/Changes:  []  No changes reported     Pt reports having aching in his R shoulder last night. Denies participating in any activity over the weekend that would inc his pain but does not he stopped taking anti-inflammatory medication. OBJECTIVE    38 min Therapeutic Exercise:  [x]  See flow sheet   Rationale:      increase ROM and increase strength to improve the patients ability to reach     10 min Manual Therapy: STM to R post cuff, pec minor release, TPR to middle delt; inf and post GHJ mob, PROM all planes, abd w/ scap block   Rationale:      decrease pain, increase ROM, increase tissue extensibility and decrease trigger points to improve patient's ability to complete ADLs     min Patient Education:  yes  Reviewed HEP   []  Progressed/Changed HEP based on:        Other Objective/Functional Measures:    28' 1:1 TE    Inc TTP noted to the R infraspinatus and middle delt  C/o pain at end-range abd and flexion PROM  Unable to achieve >80 deg SL abd due to pain      Post Treatment Pain Level (on 0 to 10) scale:   0  / 10     ASSESSMENT  Assessment/Changes in Function:     Educated pt use of ice to reduce inflammation as he is no longer taking anti-inflammatory medication     []  See Progress Note/Recertification   Patient will continue to benefit from skilled PT services to modify and progress therapeutic interventions, address functional mobility deficits, address ROM deficits, address strength deficits, analyze and address soft tissue restrictions, analyze and cue movement patterns, analyze and modify body mechanics/ergonomics, assess and modify postural abnormalities and instruct in home and community integration to attain remaining goals. Progress toward goals / Updated goals:    No significant progress this visit due to mild inc in pain level     PLAN  []  Upgrade activities as tolerated yes Continue plan of care   []  Discharge due to :    []  Other:      Therapist: Zoey Mohan, PT    Date: 6/19/2017 Time: 9:03 AM     No future appointments.

## 2017-06-20 ENCOUNTER — IMPORTED ENCOUNTER (OUTPATIENT)
Dept: URBAN - METROPOLITAN AREA CLINIC 1 | Facility: CLINIC | Age: 72
End: 2017-06-20

## 2017-06-20 PROBLEM — H25.811: Noted: 2017-06-20

## 2017-06-20 PROBLEM — Z96.1: Noted: 2017-06-20

## 2017-06-20 PROCEDURE — 92136 OPHTHALMIC BIOMETRY: CPT

## 2017-06-20 NOTE — PATIENT DISCUSSION
1.  Cataract OD: Visually Significant secondary to glare discussed the risks benefits alternatives and limitations of cataract surgery. The patient stated a full understanding and a desire to proceed with the procedure. Pt understands they will need glasses post-op to achieve their best corrected vision. Phaco PCL OD; Standard Lens standard technique. 2.  POW#1  CE/IOL OS (Standard) doing well. Use Lotemax BID OS till out Use Prolensa Qdaily OS till out Confirmed patient was given refill for PO meds.  F/u as scheduled for 2nd eye

## 2017-06-26 ENCOUNTER — HOSPITAL ENCOUNTER (OUTPATIENT)
Dept: PHYSICAL THERAPY | Age: 72
Discharge: HOME | End: 2017-06-26
Payer: MEDICARE

## 2017-06-26 PROCEDURE — 97140 MANUAL THERAPY 1/> REGIONS: CPT

## 2017-06-26 PROCEDURE — 97110 THERAPEUTIC EXERCISES: CPT

## 2017-06-26 PROCEDURE — G8984 CARRY CURRENT STATUS: HCPCS

## 2017-06-26 PROCEDURE — G8985 CARRY GOAL STATUS: HCPCS

## 2017-06-26 NOTE — PROGRESS NOTES
[facility] PHYSICAL THERAPY  [address] [place] [address] José Miguel [address], 70 [facility] - Phone: [phone]  Fax: [phone]  [place]          Patient Name: Sara Rivero : 1945   Treatment/Medical Diagnosis: Right shoulder pain [M25.511]   Onset Date: approx 2 months prior to IE    Referral Source: Pauline Love MD Start of Care [facility]): 2017   Prior Hospitalization: See Medical History Provider #: [id number]   Prior Level of Function: Previous no pain with lifting/reaching, sleeping on shoulder    Comorbidities: Hearing impaired   Medications: Verified on Patient Summary List   [facility]: 14 Missed Visits: 0     Goal/Measure of Progress Goal Met? 1. Decrease max pain 50-75% to assist with reaching overhead for ADLs. Status at last Eval: 7/10 max  Current Status: 3/10 yes   2. Improve FOTO Functional Status Score to >/=71 points in order to show significant functional improvement. Status at last Eval: 61 Current Status: 62 no   3. Will rate a +5 on Global Rating of Change and be prepared to DC to HEP. Status at last Eval: Na  Current Status: -1 no     1. Pt will demonstrate (R) shoulder AROM scaption of >/= 170 deg to indicate improved ability to perform overhead ADLs. Status at last Eval: (R) flex 131  (R) scap 111  Current Status: (R) flex 138  (R) scap 138  progressing     Key Functional Changes/Progress: Pt presented to [facility] w/ c/o (R) shoulder pain. Pt currently reports 3/10 max pain. Minimal change in FOTO scores, reporting decrease in functional ability as evidenced by -1 on GROC. Slight improvement in shoulder AROM. Pt is (I) and compliant with HEP exercises.    Problem List: pain affecting function, decrease ROM, decrease strength, decrease ADL/ functional abilitiies, decrease activity tolerance and decrease flexibility/ joint mobility   Treatment Plan may include any combination of the following: Therapeutic exercise, Therapeutic activities, Neuromuscular re-education, Physical agent/modality, Manual therapy, Patient education, Self Care training, Functional mobility training and Home safety training  Patient Goal(s) has been updated and includes:      Goals for this certification period include and are to be achieved in   1-2  weeks:  1. Improve FOTO Functional Status Score to >/=71 points in order to show significant functional improvement. 2. Will rate a +5 on Global Rating of Change and be prepared to DC to HEP. 3. Pt will demonstrate (R) shoulder AROM scaption of >/= 170 deg to indicate improved ability to perform overhead ADLs. Frequency / Duration:   Patient to be seen   2   times per week for   1-2   weeks:  G-Codes (GP): Carry [id number] Current  CJ= 20-39%    Goal  CI= 1-19%. The severity rating is based on the FOTO Score    Assessments/Recommendations: Pt to continue 2x weekly for additional 1-2 weeks to become (I) with long-term HEP. Recommend return to MD at this time to discuss alternative treatment options. If you have any questions/comments please contact us directly at [phone]. Thank you for allowing us to assist in the care of your patient.     Therapist Signature: Joan Barbosa PTA, Nubia Lau PT, DPT, OCS, CSCS Date: 1/85/3987   Certification Period:  Reporting Period: 4/24/2017 - 7/21/2017 5/24/2017 - 6/26/2017 Time: 6:49 PM   NOTE TO PHYSICIAN:  PLEASE COMPLETE THE ORDERS BELOW AND FAX TO   [facility] Physical Therapy: [phone]  If you are unable to process this request in 24 hours please contact our office: [phone]    ___ I have read the above report and request that my patient continue as recommended.   ___ I have read the above report and request that my patient continue therapy with the following changes/special instructions: ________________________________________________   ___ I have read the above report and request that my patient be discharged from therapy.      Physician Signature:        Date:       Time:

## 2017-06-26 NOTE — PROGRESS NOTES
PHYSICAL THERAPY - DAILY TREATMENT NOTE    Patient Name: Dave Carver        Date: 2017  : 1945   YES Patient  Verified  Visit #:     Insurance: Payor: Addie Host / Plan: [place] MEDICARE PART A & B / Product Type: Medicare /      In time: 8582 Out time:    Total Treatment Time: 50     Medicare Time Tracking (below)   Total Timed Codes (min):  50 1:1 Treatment Time:  40     TREATMENT AREA =  Right shoulder pain [M25.511]    SUBJECTIVE  Pain Level (on 0 to 10 scale):  1  / 10   Medication Changes/New allergies or changes in medical history, any new surgeries or procedures? NO    If yes, update Summary List   Subjective Functional Status/Changes:  []  No changes reported     Pt reporting 3/10 max pain. Still uncomfortable to reach overhead. Minimal change in symptoms since beginning PT.            OBJECTIVE    40 (30)  min Therapeutic Exercise:  [x]  See flow sheet   Rationale:      increase ROM, increase strength and improve coordination to improve the patients ability to perform pain free overhead activities. 10 Min Manual Therapy: STM/DTM (R) UT/LS, infraspinatus. (R) shoulder PROM. GHJ pa mobs. Rationale:      decrease pain, increase ROM, increase tissue extensibility and decrease trigger points to improve patient's ability to perform pain free ADLs. min Patient Education:  YES  Reviewed HEP   []  Progressed/Changed HEP based on:         Other Objective/Functional Measures:    GROC = -1  (R) shoulder AROM scap = 138 deg      Post Treatment Pain Level (on 0 to 10) scale:   1  / 10     ASSESSMENT  Assessment/Changes in Function:     See note     []  See Progress Note/Recertification   Patient will continue to benefit from skilled PT services to modify and progress therapeutic interventions, address functional mobility deficits, address ROM deficits, address strength deficits, analyze and address soft tissue restrictions, analyze and cue movement patterns, analyze and modify body mechanics/ergonomics and assess and modify postural abnormalities to attain remaining goals.    Progress toward goals / Updated goals:    See note     PLAN  [x]  Upgrade activities as tolerated YES Continue plan of care   []  Discharge due to :    []  Other:      Therapist: Aries Arias PTA    Date: 6/26/2017 Time: 1:58 PM     Future Appointments  Date Time Provider Sol Pompa   7/3/2017 3:30 PM Buster Loyd PT [facility]   7/6/2017 8:30 AM Aries Arias PTA [facility]   7/10/2017 10:00 AM Aries Arias PTA [facility]   7/13/2017 8:30 AM Aries Arias PTA [facility]

## 2017-06-28 ENCOUNTER — IMPORTED ENCOUNTER (OUTPATIENT)
Dept: URBAN - METROPOLITAN AREA CLINIC 1 | Facility: CLINIC | Age: 72
End: 2017-06-28

## 2017-06-28 PROBLEM — H25.811 COMBINED FORM OF SENILE CATARACT OF RIGHT EYE: Status: ACTIVE | Noted: 2017-06-28

## 2017-06-28 PROBLEM — H25.811 COMBINED FORM OF SENILE CATARACT OF RIGHT EYE: Status: RESOLVED | Noted: 2017-06-28 | Resolved: 2017-06-28

## 2017-06-29 ENCOUNTER — IMPORTED ENCOUNTER (OUTPATIENT)
Dept: URBAN - METROPOLITAN AREA CLINIC 1 | Facility: CLINIC | Age: 72
End: 2017-06-29

## 2017-06-29 PROBLEM — Z96.1: Noted: 2017-06-29

## 2017-06-29 PROCEDURE — 99024 POSTOP FOLLOW-UP VISIT: CPT

## 2017-06-29 NOTE — PATIENT DISCUSSION
1. POD#1 CE/IOL OD (Standard) doing well. Continue all 3 gtts as prescribed and until gone. Use Lotemax BID OD Prolensa Qdaily OD Ocuflox TID OD 2. POW#1  CE/IOL OS (Standard) doing well. Use Lotemax BID OS till out Use Prolensa Qdaily OS till out Return for an appointment in 3-4 week KR with Dr. Chhaay Jordan.

## 2017-07-03 ENCOUNTER — HOSPITAL ENCOUNTER (OUTPATIENT)
Dept: PHYSICAL THERAPY | Age: 72
Discharge: HOME | End: 2017-07-03
Payer: MEDICARE

## 2017-07-03 PROCEDURE — 97140 MANUAL THERAPY 1/> REGIONS: CPT

## 2017-07-03 PROCEDURE — 97110 THERAPEUTIC EXERCISES: CPT

## 2017-07-03 NOTE — PROGRESS NOTES
PHYSICAL THERAPY - DAILY TREATMENT NOTE    Patient Name: Amber Ayon        Date: 7/3/2017  : 1945   YES Patient  Verified  Visit #:   15   of   24  Insurance: Payor: Faustina Hamida / Plan: [place] MEDICARE PART A & B / Product Type: Medicare /      In time: 3:15 Out time: 4:16   Total Treatment Time: 61     Medicare Time Tracking (below)   Total Timed Codes (min):  61 1:1 Treatment Time:  61     TREATMENT AREA =  Right shoulder pain [M25.511]    SUBJECTIVE  Pain Level (on 0 to 10 scale):  0  / 10   Medication Changes/New allergies or changes in medical history, any new surgeries or procedures? NO    If yes, update Summary List   Subjective Functional Status/Changes:  []  No changes reported     Sometimes has pain/numbness running down arm in AM upon waking          OBJECTIVE    8 min Manual Therapy: DTM pec minor, post cuff; [address] mob post, inf; PROM/stretch all planes   Rationale:      decrease pain, increase ROM and increase tissue extensibility to improve patient's ability to lift/reach    53 min Therapeutic Exercise:  [x]  See flow sheet   Rationale:      increase ROM and increase strength to improve the patients ability to lift/reach      min Patient Education:  YES  Reviewed HEP   []  Progressed/Changed HEP based on:         Other Objective/Functional Measures:    + empty can for pain  Continue sig capsular tightness with 90/90 ER to approx 30 deg, tight IR behind back  Noted pain down arm with 90/90 ER stretch     Post Treatment Pain Level (on 0 to 10) scale:   1  / 10     ASSESSMENT  Assessment/Changes in Function:     Cont pain and difficulty into end range flex, scap, ER/IR     []  See Progress Note/Recertification   Patient will continue to benefit from skilled PT services to modify and progress therapeutic interventions, address functional mobility deficits, address ROM deficits, address strength deficits, analyze and address soft tissue restrictions and analyze and cue movement patterns to attain remaining goals.    Progress toward goals / Updated goals:    No change to goals today; cont tight into end ranges     PLAN  [x]  Upgrade activities as tolerated YES Continue plan of care   []  Discharge due to :    []  Other: Will schedule f/u with MD piedra     Therapist: Christy Vieira PT, DPT, OCS, CSCS    Date: 7/3/2017 Time: 3:44 PM     Future Appointments  Date Time Provider Sol Pompa   7/6/2017 8:30 AM Zack Black PTA [facility]   7/10/2017 10:00 AM Zack Black PTA [facility]   7/13/2017 8:30 AM Zack Black PTA [facility]

## 2017-07-05 ENCOUNTER — HOSPITAL ENCOUNTER (OUTPATIENT)
Dept: LAB | Age: 72
Discharge: HOME | End: 2017-07-05
Payer: MEDICARE

## 2017-07-05 ENCOUNTER — TELEPHONE (OUTPATIENT)
Dept: FAMILY MEDICINE CLINIC | Age: 72
End: 2017-07-05

## 2017-07-05 DIAGNOSIS — E78.00 PURE HYPERCHOLESTEROLEMIA: ICD-10-CM

## 2017-07-05 DIAGNOSIS — E78.00 PURE HYPERCHOLESTEROLEMIA: Primary | ICD-10-CM

## 2017-07-05 LAB — TSH SERPL DL<=0.05 MIU/L-ACNC: 0.26 UIU/ML (ref 0.36–3.74)

## 2017-07-05 PROCEDURE — 84443 ASSAY THYROID STIM HORMONE: CPT | Performed by: INTERNAL MEDICINE

## 2017-07-05 PROCEDURE — 36415 COLL VENOUS BLD VENIPUNCTURE: CPT | Performed by: INTERNAL MEDICINE

## 2017-07-05 NOTE — TELEPHONE ENCOUNTER
Pt called stating his thyroid meds were adjusted at his last visit and he thinks he will needs labs drawn. Please review and order accordingly.

## 2017-07-06 ENCOUNTER — HOSPITAL ENCOUNTER (OUTPATIENT)
Dept: PHYSICAL THERAPY | Age: 72
Discharge: HOME | End: 2017-07-06
Payer: MEDICARE

## 2017-07-06 PROCEDURE — 97110 THERAPEUTIC EXERCISES: CPT

## 2017-07-06 PROCEDURE — 97140 MANUAL THERAPY 1/> REGIONS: CPT

## 2017-07-06 RX ORDER — LEVOTHYROXINE SODIUM 112 UG/1
112 TABLET ORAL
Qty: 90 TAB | Refills: 0 | Status: SHIPPED | OUTPATIENT
Start: 2017-07-06 | End: 2017-10-27 | Stop reason: SDUPTHER

## 2017-07-06 NOTE — PROGRESS NOTES
Tell pt his labs show he is still getting too much thyroid medication. I want him to take 112mcg daily.   We will repeat in 2 mos

## 2017-07-06 NOTE — PROGRESS NOTES
Spoke to pt re: recent lab results and MD recommendations. Pt verbalized understanding and had no questions at this time. Does not need any refills at this time.

## 2017-07-06 NOTE — PROGRESS NOTES
PHYSICAL THERAPY - DAILY TREATMENT NOTE    Patient Name: Ana Ramirez        Date: 2017  : 1945   YES Patient  Verified  Visit #:     Insurance: Payor: Bernie Avelar / Plan: [place] / Product Type: Medicare /      In time: 555 Out time: 920   Total Treatment Time: 45     Medicare Time Tracking (below)   Total Timed Codes (min):  45 1:1 Treatment Time:  45     TREATMENT AREA =  Right shoulder pain [M25.511]    SUBJECTIVE  Pain Level (on 0 to 10 scale):  0  / 10   Medication Changes/New allergies or changes in medical history, any new surgeries or procedures? NO    If yes, update Summary List   Subjective Functional Status/Changes:  []  No changes reported     Little to no pain today. Goes back to MD on the  followed by a few weeks of vacation. Not sure how that time period is going to work out. OBJECTIVE  30 min Therapeutic Exercise:  [x]  See flow sheet   Rationale:      increase ROM, increase strength and improve coordination to improve the patients ability to perform pain free ADLs. 15 Min Manual Therapy: STM/DTM (R) periscapular mms. Rationale:      decrease pain, increase ROM, increase tissue extensibility and decrease trigger points to improve patient's ability to perform pain free ADLs. min Patient Education:  YES  Reviewed HEP   []  Progressed/Changed HEP based on: Other Objective/Functional Measures: Therex per flow sheet. Post Treatment Pain Level (on 0 to 10) scale:   1  / 10     ASSESSMENT  Assessment/Changes in Function:     PROM flexion limited by ms guarding/pain. Instructed pt to continue with HEP exercises.        []  See Progress Note/Recertification   Patient will continue to benefit from skilled PT services to modify and progress therapeutic interventions, address functional mobility deficits, address ROM deficits, address strength deficits, analyze and address soft tissue restrictions, analyze and cue movement patterns, analyze and modify body mechanics/ergonomics and assess and modify postural abnormalities to attain remaining goals. Progress toward goals / Updated goals:    No change in progress toward LTG's with today's session.       PLAN  [x]  Upgrade activities as tolerated YES Continue plan of care   []  Discharge due to :    []  Other:      Therapist: Michelle Palm PTA    Date: 7/6/2017 Time: 8:36 AM     Future Appointments  Date Time Provider Sol Pompa   7/10/2017 10:00 AM Michelle Palm PTA [place] [address]   7/13/2017 8:30 AM Michelle Palm PTA [address]

## 2017-07-10 ENCOUNTER — HOSPITAL ENCOUNTER (OUTPATIENT)
Dept: PHYSICAL THERAPY | Age: 72
Discharge: HOME | End: 2017-07-10
Payer: MEDICARE

## 2017-07-10 PROCEDURE — 97110 THERAPEUTIC EXERCISES: CPT

## 2017-07-10 PROCEDURE — 97140 MANUAL THERAPY 1/> REGIONS: CPT

## 2017-07-10 NOTE — PROGRESS NOTES
PHYSICAL THERAPY - DAILY TREATMENT NOTE    Patient Name: Kai Rivas        Date: 7/10/2017  : 1945   YES Patient  Verified  Visit #:     Insurance: Payor: Mishel Macario / Plan: [place] MED[place]ARE PART A & B / Product Type: Medicare /      In time:  Out time:    Total Treatment Time: 50     Medicare Time Tracking (below)   Total Timed Codes (min):  50 1:1 Treatment Time:  50     TREATMENT AREA =  Right shoulder pain [M25.511]    SUBJECTIVE  Pain Level (on 0 to 10 scale):  0  / 10   Medication Changes/New allergies or changes in medical history, any new surgeries or procedures? NO    If yes, update Summary List   Subjective Functional Status/Changes:  []  No changes reported     Rochelle sal[place] when I move it overhead. \"          OBJECTIVE  35 min Therapeutic Exercise:  [x]  See flow sheet   Rationale:      increase ROM, increase strength and improve coordination to improve the patients ability to perform pain free ADLs. 15 min Manual Therapy: STM/DTM (B) t/s paraspinals, (R) periscapular mms. (R) shoulder PROM. GHJ pa mobs. Rationale:      decrease pain, increase ROM, increase tissue extensibility and decrease trigger points to improve patient's ability to perform pain free overhead ADLs. min Patient Education:  YES  Reviewed HEP   []  Progressed/Changed HEP based on: Other Objective/Functional Measures:    ROM restricted w/ flexion/scap/ER. Light VC's required for therex. Post Treatment Pain Level (on 0 to 10) scale:   0   / 10     ASSESSMENT  Assessment/Changes in Function:     No exacerbation of pain, only ERP noted, resolved upon return to starting position.       []  See Progress Note/Recertification   Patient will continue to benefit from skilled PT services to modify and progress therapeutic interventions, address functional mobility deficits, address ROM deficits, address strength deficits, analyze and address soft tissue restrictions, analyze and cue movement patterns, analyze and modify body mechanics/ergonomics and assess and modify postural abnormalities to attain remaining goals. Progress toward goals / Updated goals:    No change in progress toward LTG's with today's session.       PLAN  [x]  Upgrade activities as tolerated YES Continue plan of care   []  Discharge due to :    []  Other:      Therapist: Gallo Mello PTA    Date: 7/10/2017 Time: 10:16 AM     Future Appointments  Date Time Provider Sol Pompa   7/13/2017 8:30 AM Gallo Mello PTA [facility]

## 2017-07-13 ENCOUNTER — HOSPITAL ENCOUNTER (OUTPATIENT)
Dept: PHYSICAL THERAPY | Age: 72
Discharge: HOME | End: 2017-07-13
Payer: MEDICARE

## 2017-07-13 PROCEDURE — G8985 CARRY GOAL STATUS: HCPCS

## 2017-07-13 PROCEDURE — 97110 THERAPEUTIC EXERCISES: CPT

## 2017-07-13 PROCEDURE — 97140 MANUAL THERAPY 1/> REGIONS: CPT

## 2017-07-13 PROCEDURE — G8986 CARRY D/C STATUS: HCPCS

## 2017-07-13 NOTE — PROGRESS NOTES
PHYSICAL THERAPY - DAILY TREATMENT NOTE    Patient Name: Micah Graham        Date: 2017  : 1945   YES Patient  Verified  Visit #:     Insurance: Payor: Anuel Art / Plan: [place] MEDICARE PART A & B / Product Type: Medicare /      In time: 991 Out time: 930   Total Treatment Time: 60     Medicare Time Tracking (below)   Total Timed Codes (min):  50 1:1 Treatment Time:  30     TREATMENT AREA =  Right shoulder pain [M25.511]    SUBJECTIVE  Pain Level (on 0 to 10 scale):  0  / 10   Medication Changes/New allergies or changes in medical history, any new surgeries or procedures? NO    If yes, update Summary List   Subjective Functional Status/Changes:  []  No changes reported     \"I'm feeling good today. Think things are starting to work. \"       OBJECTIVE    35 (15) min Therapeutic Exercise:  [x]  See flow sheet   Rationale:      increase ROM, increase strength and improve coordination to improve the patients ability to perform pain free ADLs. 15 min Manual Therapy: (R) shoulder PROM. STM/DtM and TPR (R) t/s paraspinals, periscapular mms, post cuff and pec minor. J pa mobs. Rationale:      decrease pain, increase ROM, increase tissue extensibility and decrease trigger points to improve patient's ability to perform pain free ADLs. min Patient Education:  YES  Reviewed HEP   []  Progressed/Changed HEP based on: Other Objective/Functional Measures:    FOTO = 63   GROC = +3     (R) shoulder PROM = 150 deg flexion  (R) shoulder AROM = 132 deg flexion. Post Treatment Pain Level (on 0 to 10) scale:   0  / 10     ASSESSMENT  Assessment/Changes in Function:     See DC      []  See Progress Note/Recertification   Patient will continue to benefit from skilled PT services to    Progress toward goals / Updated goals:    See DC      PLAN  []  Upgrade activities as tolerated No Continue plan of care   [x]  Discharge due to : Program complete.      []  Other:      Therapist: Shemar Hassan, RITU    Date: 7/13/2017 Time: 8:35 AM     No future appointments.

## 2017-07-13 NOTE — PROGRESS NOTES
[address] PHYSICAL THERAPY  [address] [place] [address], [place] [address],[facility], [facility] - Phone: [phone]  Fax: [phone]          Patient Name: Yoko Payne : 1945   Treatment/Medical Diagnosis: Right shoulder pain [M25.511]   Onset Date: approx 2 months prior to IE    Referral Source: Mikey Toro MD Start of Care [facility]): 2017   Prior Hospitalization: See Medical History Provider #: [id number]   Prior Level of Function: Previous no pain with lifting/reaching, sleeping on shoulder   Comorbidities: Hearing impaired   Medications: Verified on Patient Summary List   Visits from [facility]: 15 Missed Visits: 2     Goal/Measure of Progress Goal Met? 1. Improve FOTO Functional Status Score to >/=71 points in order to show significant functional improvement. Status at last Eval: 61 @ eval Current Status: 63 progressing   2. Will rate a +5 on Global Rating of Change and be prepared to DC to HEP. Status at last Eval: -1  Current Status: +3  progressing   3. Pt will demonstrate (R) shoulder AROM scaption of >/= 170 deg to indicate improved ability to perform overhead ADLs. Status at last Eval: 138 deg scap AROM Current Status: 132 deg scap AROM no     Key Functional Changes/Progress: Pt presented to [facility] w/ c/o (R) shoulder pain. Pt currently reporting 0/10 pain as long as overhead motion is avoided. ROM remains limited w/ pain into end range flex, scap, ER/IR. Pt + empty can test.  Pt has been advised to continue with long-term HEP and follow up with MD to address remaining deficits. G-Codes (GP): Carry  [id number] Goal  CI= 1-19%   D/C  CJ= 20-39%. The severity rating is based on the FOTO Score    Assessments/Recommendations: Discontinue therapy due to lack of appreciable progress towards goals. If you have any questions/comments please contact us directly at [phone].    Thank you for allowing us to assist in the care of your patient. Therapist Signature: Junior Vicente PTA/Jose Guadalupe Cabrera PT, DPT, OCS, CSCS Date: 5/98/8736   Certification Period:   Reporting Period: 4/24/2017 - 7/21/2017 6/26/2017 - 7/13/2017 Time: 12:49 PM     NOTE TO PHYSICIAN:  Your patient's insurance requires this discharge note be signed and returned. PLEASE COMPLETE THE ORDERS BELOW AND RETURN TO:  [facility]    ___ I have read the above report and request that my patient be discharged from therapy.      Physician Signature:        Date:       Time:

## 2017-08-14 ENCOUNTER — IMPORTED ENCOUNTER (OUTPATIENT)
Dept: URBAN - METROPOLITAN AREA CLINIC 1 | Facility: CLINIC | Age: 72
End: 2017-08-14

## 2017-08-14 PROBLEM — Z96.1: Noted: 2017-08-14

## 2017-08-14 PROCEDURE — 99024 POSTOP FOLLOW-UP VISIT: CPT

## 2017-08-14 NOTE — PATIENT DISCUSSION
POW#3 Phaco/ PCL OU (Standard OU) doing well Finish PO meds per schedule MRX for glasses given Use Maxitrol lion PRN to lids (Pt was having irritation/ redness on lids) Begin Hot Compresses Qdaily to lids x5 minutes. Return for an appointment in 4-6 mo 30 OCT VF 24-2 OU with Dr. Lili Menard.

## 2017-10-27 ENCOUNTER — OFFICE VISIT (OUTPATIENT)
Dept: FAMILY MEDICINE CLINIC | Age: 72
End: 2017-10-27

## 2017-10-27 VITALS
HEART RATE: 51 BPM | DIASTOLIC BLOOD PRESSURE: 70 MMHG | TEMPERATURE: 97.8 F | HEIGHT: 69 IN | OXYGEN SATURATION: 98 % | BODY MASS INDEX: 29.18 KG/M2 | SYSTOLIC BLOOD PRESSURE: 100 MMHG | RESPIRATION RATE: 18 BRPM | WEIGHT: 197 LBS

## 2017-10-27 DIAGNOSIS — E78.00 PURE HYPERCHOLESTEROLEMIA: ICD-10-CM

## 2017-10-27 DIAGNOSIS — J43.2 CENTRILOBULAR EMPHYSEMA (HCC): ICD-10-CM

## 2017-10-27 DIAGNOSIS — E03.4 HYPOTHYROIDISM DUE TO ACQUIRED ATROPHY OF THYROID: Primary | ICD-10-CM

## 2017-10-27 RX ORDER — NAPROXEN SODIUM 220 MG
220 TABLET ORAL 2 TIMES DAILY WITH MEALS
COMMUNITY

## 2017-10-27 RX ORDER — ASPIRIN 81 MG/1
TABLET ORAL DAILY
COMMUNITY

## 2017-10-27 RX ORDER — FLUTICASONE PROPIONATE 50 MCG
2 SPRAY, SUSPENSION (ML) NASAL DAILY
Qty: 3 BOTTLE | Refills: 3 | Status: SHIPPED | OUTPATIENT
Start: 2017-10-27

## 2017-10-27 RX ORDER — SIMVASTATIN 80 MG/1
TABLET, FILM COATED ORAL
Qty: 90 TAB | Refills: 3 | Status: SHIPPED | OUTPATIENT
Start: 2017-10-27

## 2017-10-27 RX ORDER — LEVOTHYROXINE SODIUM 112 UG/1
112 TABLET ORAL
Qty: 90 TAB | Refills: 0 | Status: SHIPPED | OUTPATIENT
Start: 2017-10-27 | End: 2017-11-07 | Stop reason: SDUPTHER

## 2017-10-27 RX ORDER — MONTELUKAST SODIUM 10 MG/1
10 TABLET ORAL DAILY
Qty: 90 TAB | Refills: 3 | Status: SHIPPED | OUTPATIENT
Start: 2017-10-27

## 2017-10-27 NOTE — PATIENT INSTRUCTIONS
Tiotropium (By breathing)   Tiotropium ([id number]-jp-[id number]-pee-um)  Treats asthma and chronic obstructive pulmonary disease (COPD). Brand Name(s): Spiriva, Spiriva Respimat   There may be other brand names for this medicine. When This Medicine Should Not Be Used: This medicine is not right for everyone. Do not use it if you had an allergic reaction to tiotropium, ipratropium, or atropine. How to Use This Medicine:   Capsule, Spray  · Your doctor will tell you how much medicine to use. Do not use more than directed. Use this medicine at the same time each day. · Read and follow the patient instructions that come with this medicine. Talk to your doctor or pharmacist if you have any questions. · Spiriva® HandiHaler®:   ¨ Spiriva® capsules should be used only with the HandiHaler® device. Do not swallow the capsule. Do not use the device with any other medicine. ¨ Do not remove the capsule from the blister pack until you are ready to use it. Use the capsule right away once you have opened a blister pack. ¨ Do not let the powder from the capsule get into your eyes. ¨ After you have used a dose, remove the used capsule and throw it away. · Spiriva® Respimat® inhaler:   ¨ Do not use the inhaler with any other medicine. ¨ Do not turn the clear base before you insert the cartridge. ¨ Do not remove the cartridge once it has been inserted in the inhaler. ¨ When you use the inhaler for the first time, you will need to prime it to make sure it delivers the correct amount of medicine. To prime the inhaler, point it away from your face and press the dose release button until you see a spray of medicine. Then press the button 3 more times. The inhaler is now ready to use. ¨ If you have not used your inhaler for more than 3 days, spray 1 dose of medicine away from your face to prime the inhaler.  If you have not used your inhaler for more than 21 days, repeat the instructions for priming the inhaler for the first time.  · Missed dose: Take a dose as soon as you remember. If it is almost time for your next dose, wait until then and take a regular dose. Do not take extra medicine to make up for a missed dose. Do not use this medicine more than once every 24 hours. · Store the medicine in a closed container at room temperature, away from heat, moisture, and direct light. ¨ Spiriva® HandiHaler®: Leave the capsules in the blister pack until you are ready to use the medicine. ¨ Spiriva® Respimat® inhaler: Throw away the inhaler 3 months after its first use. Drugs and Foods to Avoid:   Ask your doctor or pharmacist before using any other medicine, including over-the-counter medicines, vitamins, and herbal products. · Some medicines can affect how tiotropium works. Tell your doctor if you are using ipratropium or other inhaled medicines. Warnings While Using This Medicine:   · Tell your doctor if you are pregnant or breastfeeding, or if you have kidney disease, glaucoma, prostate problems, or problems urinating. Tell your doctor if you are allergic to milk proteins. · This medicine may cause the following problems:  ¨ Paradoxical bronchospasm (increased trouble breathing), which can be life-threatening  ¨ New or worsening narrow-angle glaucoma  ¨ New or worsening trouble urinating  · This medicine may cause dizziness or blurred vision. Do not drive or do anything else that could be dangerous until you know how this medicine affects you. · Tell your doctor right away if you use other medicines for your lung condition and they do not seem to be working as well as usual. Do not change your doses or stop using your medicines before you talk to your doctor. · Your doctor will check your progress and the effects of this medicine at regular visits. Keep all appointments. · Keep all medicine out of the reach of children. Never share your medicine with anyone.   Possible Side Effects While Using This Medicine:   Call your doctor right away if you notice any of these side effects:  · Allergic reaction: Itching or hives, swelling in your face or hands, swelling or tingling in your mouth or throat, chest tightness, trouble breathing  · Change in how much or how often you urinate, painful or difficult urination  · Eye pain or redness, vision problems, seeing halos around lights  · Increased trouble breathing  If you notice these less serious side effects, talk with your doctor:   · Dry mouth  · Cough, fever, runny nose, or sore throat  If you notice other side effects that you think are caused by this medicine, tell your doctor. Call your doctor for medical advice about side effects. You may report side effects to FDA at 6-238-FDA-1213  © 2017 [address] Information is for End User's use only and may not be sold, redistributed or otherwise used for commercial purposes. The above information is an  only. It is not intended as medical advice for individual conditions or treatments. Talk to your doctor, nurse or pharmacist before following any medical regimen to see if it is safe and effective for you.

## 2017-10-27 NOTE — PROGRESS NOTES
Shira Tamayo is a 67 y.o. male (: 1945) presenting to address:    No chief complaint on file. Vitals:    10/27/17 0703   BP: 100/70   Pulse: (!) 51   Resp: 18   Temp: 97.8 °F (36.6 °C)   TempSrc: Oral   SpO2: 91%   Weight: 197 lb (89.4 kg)   Height: 5' 9\" (1.753 m)   PainSc:   3   PainLoc: Back       Hearing/Vision:   No exam data present    Learning Assessment:     Learning Assessment 2014   PRIMARY LEARNER Patient   PRIMARY LANGUAGE ENGLISH   LEARNER PREFERENCE PRIMARY OTHER (COMMENT)   ANSWERED BY Patient   RELATIONSHIP SELF     Depression Screening:     PHQ over the last two weeks 10/27/2017   Little interest or pleasure in doing things Not at all   Feeling down, depressed or hopeless Not at all   Total Score PHQ 2 0     Fall Risk Assessment:     Fall Risk Assessment, last 12 mths 10/27/2017   Able to walk? Yes   Fall in past 12 months? Yes     Abuse Screening:     Abuse Screening Questionnaire 10/27/2017   Do you ever feel afraid of your partner? N   Are you in a relationship with someone who physically or mentally threatens you? N   Is it safe for you to go home? Y     Coordination of Care Questionaire:   1. Have you been to the ER, urgent care clinic since your last visit? Hospitalized since your last visit? No     2. Have you seen or consulted any other health care providers outside of the [address] [place] [address] since your last visit? Include any pap smears or colon screening. Yes, eye dr Dr. Renita Opitz     Advanced Directive:   1. Do you have an Advanced Directive?yes  2. Would you like information on Advanced Directives?no     Health Maintenance Due   Topic Date Due    INFLUENZA AGE 9 TO ADULT  2017   Flu shot at the [facility].

## 2017-10-27 NOTE — MR AVS SNAPSHOT
Visit Information Date & Time Provider Department Dept. Phone Encounter #  
 10/27/2017  7:00 AM Diya Devin, 3 [facility] [phone] [id number] Upcoming Health Maintenance Date Due Pneumococcal 65+ Low/Medium Risk (2 of 2 - PPSV23) 1/16/2018 MEDICARE YEARLY EXAM 1/17/2018 GLAUCOMA SCREENING Q2Y 8/1/2018 COLONOSCOPY 4/18/2024 DTaP/Tdap/Td series (2 - Td) 1/16/2027 Allergies as of 10/27/2017  Review Complete On: 10/27/2017 By: Diya Beltran MD  
  
 Severity Noted Reaction Type Reactions Latex  05/10/2013    Rash Other Plant, Animal, Environmental  04/24/2017    Other (comments) Dust, mold, pollen: congestion Current Immunizations  Never Reviewed Name Date Influenza Vaccine 10/11/2014 Influenza Vaccine Split 10/12/2012 Not reviewed this visit You Were Diagnosed With   
  
 Codes Comments Hypothyroidism due to acquired atrophy of thyroid    -  Primary ICD-10-CM: E03.4 ICD-9-CM: 244.8, 246.8 Pure hypercholesterolemia     ICD-10-CM: E78.00 ICD-9-CM: 272.0 Centrilobular emphysema ([address]     ICD-10-CM: J43.2 ICD-9-CM: 492.8 Vitals BP Pulse Temp Resp Height(growth percentile) Weight(growth percentile) 100/70 (BP 1 Location: Left arm, BP Patient Position: Sitting) (!) 51 97.8 °F (36.6 °C) (Oral) 18 5' 9\" (1.753 m) 197 lb (89.4 kg) SpO2 BMI Smoking Status 98% 29.09 kg/m2 Former Smoker Vitals History BMI and BSA Data Body Mass Index Body Surface Area  
 29.09 kg/m 2 2.09 m 2 Preferred Pharmacy Pharmacy Name Phone [address] [phone] Your Updated Medication List  
  
   
This list is accurate as of: 10/27/17  7:27 AM.  Always use your most recent med list.  
  
  
  
  
 acetaminophen 500 mg tablet Commonly known as:  TYLENOL Take  by mouth every six (6) hours as needed for Pain. ALEVE 220 mg tablet Generic drug:  naproxen sodium Take 220 mg by mouth two (2) times daily (with meals). aspirin delayed-release 81 mg tablet Take  by mouth daily. fluticasone 50 mcg/actuation nasal spray Commonly known as:  Lennice Boss 2 Sprays by Both Nostrils route daily. levothyroxine 112 mcg tablet Commonly known as:  SYNTHROID Take 1 Tab by mouth Daily (before breakfast). montelukast 10 mg tablet Commonly known as:  SINGULAIR Take 1 Tab by mouth daily. simvastatin 80 mg tablet Commonly known as:  ZOCOR  
TAKE 1 TABLET NIGHTLY  
  
 tiotropium 18 mcg inhalation capsule Commonly known as:  [address] [place] [address] Vivo Take 1 Cap by inhalation daily. Prescriptions Sent to Pharmacy Refills  
 levothyroxine (SYNTHROID) 112 mcg tablet 0 Sig: Take 1 Tab by mouth Daily (before breakfast). Class: Normal  
 Pharmacy: [address] [place] [address] Ph #: [phone] Route: Oral  
 montelukast (SINGULAIR) 10 mg tablet 3 Sig: Take 1 Tab by mouth daily. Class: Normal  
 Pharmacy: [address] [place] [address] Ph #: [phone] Route: Oral  
 simvastatin (ZOCOR) 80 mg tablet 3 Sig: TAKE 1 TABLET NIGHTLY Class: Normal  
 Pharmacy: [address] [place] [address] Ph #: [phone]  
 fluticasone [facility]) 50 mcg/actuation nasal spray 3 Si Sprays by Both Nostrils route daily. Class: Normal  
 Pharmacy: [address] [place] [address] Ph #: [phone] Route: Both Nostrils  
 tiotropium (SPIRIVA WITH HANDIHALER) 18 mcg inhalation capsule 3 Sig: Take 1 Cap by inhalation daily. Class: Normal  
 Pharmacy: [address] [place] [address] Ph #: [phone] Route: Inhalation To-Do List   
 10/27/2017   Lab:  TSH 3RD GENERATION   
 Patient Instructions Tiotropium (By breathing) Tiotropium ([id number]-pee-um) Treats asthma and chronic obstructive pulmonary disease (COPD). Brand Name(s): Spiriva, Spiriva Respimat There may be other brand names for this medicine. When This Medicine Should Not Be Used: This medicine is not right for everyone. Do not use it if you had an allergic reaction to tiotropium, ipratropium, or atropine. How to Use This Medicine:  
Capsule, Spray · Your doctor will tell you how much medicine to use. Do not use more than directed. Use this medicine at the same time each day. · Read and follow the patient instructions that come with this medicine. Talk to your doctor or pharmacist if you have any questions. · Spiriva® HandiHaler®:  
¨ Spiriva® capsules should be used only with the HandiHaler® device. Do not swallow the capsule. Do not use the device with any other medicine. ¨ Do not remove the capsule from the blister pack until you are ready to use it. Use the capsule right away once you have opened a blister pack. ¨ Do not let the powder from the capsule get into your eyes. ¨ After you have used a dose, remove the used capsule and throw it away. · Spiriva® Respimat® inhaler: ¨ Do not use the inhaler with any other medicine. ¨ Do not turn the clear base before you insert the cartridge. ¨ Do not remove the cartridge once it has been inserted in the inhaler. ¨ When you use the inhaler for the first time, you will need to prime it to make sure it delivers the correct amount of medicine. To prime the inhaler, point it away from your face and press the dose release button until you see a spray of medicine. Then press the button 3 more times. The inhaler is now ready to use. ¨ If you have not used your inhaler for more than 3 days, spray 1 dose of medicine away from your face to prime the inhaler.  If you have not used your inhaler for more than 21 days, repeat the instructions for priming the inhaler for the first time. · Missed dose: Take a dose as soon as you remember. If it is almost time for your next dose, wait until then and take a regular dose. Do not take extra medicine to make up for a missed dose. Do not use this medicine more than once every 24 hours. · Store the medicine in a closed container at room temperature, away from heat, moisture, and direct light. ¨ Spiriva® HandiHaler®: Leave the capsules in the blister pack until you are ready to use the medicine. ¨ Spiriva® Respimat® inhaler: Throw away the inhaler 3 months after its first use. Drugs and Foods to Avoid: Ask your doctor or pharmacist before using any other medicine, including over-the-counter medicines, vitamins, and herbal products. · Some medicines can affect how tiotropium works. Tell your doctor if you are using ipratropium or other inhaled medicines. Warnings While Using This Medicine: · Tell your doctor if you are pregnant or breastfeeding, or if you have kidney disease, glaucoma, prostate problems, or problems urinating. Tell your doctor if you are allergic to milk proteins. · This medicine may cause the following problems: 
¨ Paradoxical bronchospasm (increased trouble breathing), which can be life-threatening ¨ New or worsening narrow-angle glaucoma ¨ New or worsening trouble urinating · This medicine may cause dizziness or blurred vision. Do not drive or do anything else that could be dangerous until you know how this medicine affects you. · Tell your doctor right away if you use other medicines for your lung condition and they do not seem to be working as well as usual. Do not change your doses or stop using your medicines before you talk to your doctor. · Your doctor will check your progress and the effects of this medicine at regular visits. Keep all appointments. · Keep all medicine out of the reach of children. Never share your medicine with anyone. Possible Side Effects While Using This Medicine:  
Call your doctor right away if you notice any of these side effects: · Allergic reaction: Itching or hives, swelling in your face or hands, swelling or tingling in your mouth or throat, chest tightness, trouble breathing · Change in how much or how often you urinate, painful or difficult urination · Eye pain or redness, vision problems, seeing halos around lights · Increased trouble breathing If you notice these less serious side effects, talk with your doctor: · Dry mouth · Cough, fever, runny nose, or sore throat If you notice other side effects that you think are caused by this medicine, tell your doctor. Call your doctor for medical advice about side effects. You may report side effects to FDA at 1-955-JIS-0083 © 2017 [facility] Information is for End User's use only and may not be sold, redistributed or otherwise used for commercial purposes. The above information is an  only. It is not intended as medical advice for individual conditions or treatments. Talk to your doctor, nurse or pharmacist before following any medical regimen to see if it is safe and effective for you. Introducing [facility]! [employer] introduces [employer] patient portal. Now you can access parts of your medical record, email your doctor's office, and request medication refills online. 1. In your internet browser, go to [email or web address] 2. Click on the First Time User? Click Here link in the Sign In box. You will see the New Member Sign Up page. 3. Enter your [employer] Access Code exactly as it appears below. You will not need to use this code after youve completed the sign-up process. If you do not sign up before the expiration date, you must request a new code. · [employer] Access Code: [email or web address] Expires: 1/25/2018  7:27 AM 
 
 4. Enter the last four digits of your Social Security Number (xxxx) and Date of Birth (mm/dd/yyyy) as indicated and click Submit. You will be taken to the next sign-up page. 5. Create a [employer] ID. This will be your [employer] login ID and cannot be changed, so think of one that is secure and easy to remember. 6. Create a [employer] password. You can change your password at any time. 7. Enter your Password Reset Question and Answer. This can be used at a later time if you forget your password. 8. Enter your e-mail address. You will receive e-mail notification when new information is available in [address] 9. Click Sign Up. You can now view and download portions of your medical record. 10. Click the Download Summary menu link to download a portable copy of your medical information. If you have questions, please visit the Frequently Asked Questions section of the [employer] website. Remember, [employer] is NOT to be used for urgent needs. For medical emergencies, dial 911. Now available from your iPhone and Android! Please provide this summary of care documentation to your next provider. Your primary care clinician is listed as Pat[address] 13. If you have any questions after today's visit, please call [phone].

## 2017-10-27 NOTE — PROGRESS NOTES
Assessment/Plan:    1. Hypothyroidism due to acquired atrophy of thyroid  - TSH 3RD GENERATION; Future  - levothyroxine (SYNTHROID) 112 mcg tablet; Take 1 Tab by mouth Daily (before breakfast). Dispense: 90 Tab; Refill: 0    2. Pure hypercholesterolemia  - simvastatin (ZOCOR) 80 mg tablet; TAKE 1 TABLET NIGHTLY  Dispense: 90 Tab; Refill: 3    3. Centrilobular emphysema ([address]- start spiriva  - tiotropium (SPIRIVA WITH HANDIHALER) 18 mcg inhalation capsule; Take 1 Cap by inhalation daily. Dispense: 90 Cap; Refill: 3    The plan was discussed with the patient. The patient verbalized understanding and is in agreement with the plan. All medication potential side effects were discussed with the patient. Health Maintenance:  F/u in 1/2018 for [address] Maintenance   Topic Date Due    Pneumococcal 65+ Low/Medium Risk (2 of 2 - PPSV23) 01/16/2018    MEDICARE YEARLY EXAM  01/17/2018    GLAUCOMA SCREENING Q2Y  08/01/2018    COLONOSCOPY  04/18/2024    DTaP/Tdap/Td series (2 - Td) 01/16/2027    ZOSTER VACCINE AGE 60>  Addressed    INFLUENZA AGE 9 TO ADULT  Completed     Gabriela Junior is a 67 y.o. male and presents with Cholesterol Problem     Subjective:  Hypothyroid - dose synthroid decreased. Due for repeat. Centrilobular emphysema - c/o SOSA. He has to stop and 'catch his breath' while mowing. PFTs were abnormal, but didn't meet criteria for COPD (FEV1/FVC 79%). He declines meds at this time. ROS:  Constitutional: No recent weight change. No weakness/fatigue. No f/c. Cardiovascular: No CP/palpitations.  + SOSA/no orthopnea/PND. Respiratory: No cough/sputum, dyspnea, wheezing. Gastointestinal: No dysphagia, reflux. No n/v. No constipation/diarrhea. No melena/rectal bleeding. Genitourinary: No dysuria, urinary hesitancy, nocturia, hematuria. No incontinence. Neurological: No seizures/numbness/weakness. No paresthesias.      The problem list was updated as a part of today's visit. Patient Active Problem List   Diagnosis Code    ED (erectile dysfunction) N52.9    Eczema L30.9    Hypothyroid E03.9    Allergic rhinitis J30.9    Pure hypercholesterolemia E78.00    SOSA (dyspnea on exertion) R06.09    ERIK on CPAP G47.33, Z99.89    Centrilobular emphysema (HCC) J43.2    Advance care planning Z71.89    Ventral hernia without obstruction or gangrene K43.9       The PSH, FH were reviewed. SH:  Social History   Substance Use Topics    Smoking status: Former Smoker     Years: 8.00     Types: Cigarettes, Pipe    Smokeless tobacco: Never Used      Comment: 1971    Alcohol use No       Medications/Allergies:  Current Outpatient Prescriptions on File Prior to Visit   Medication Sig Dispense Refill    levothyroxine (SYNTHROID) 112 mcg tablet Take 1 Tab by mouth Daily (before breakfast). 90 Tab 0    acetaminophen (TYLENOL) 500 mg tablet Take  by mouth every six (6) hours as needed for Pain.  montelukast (SINGULAIR) 10 mg tablet Take 1 Tab by mouth daily. 90 Tab 3    simvastatin (ZOCOR) 80 mg tablet TAKE 1 TABLET NIGHTLY 90 Tab 3    fluticasone (FLONASE) 50 mcg/actuation nasal spray 2 Sprays by Both Nostrils route daily. 3 Bottle 3    fexofenadine (ALLEGRA) 180 mg tablet Take 1 Tab by mouth daily. 90 Tab 3    GLUCOSAMINE SULFATE (GLUCOSAMINE PO) Take  by mouth daily. No current facility-administered medications on file prior to visit. Allergies   Allergen Reactions    Latex Rash    Other Plant, Animal, Environmental Other (comments)     Dust, mold, pollen: congestion       Objective:  Visit Vitals    /70 (BP 1 Location: Left arm, BP Patient Position: Sitting)    Pulse (!) 51    Temp 97.8 °F (36.6 °C) (Oral)    Resp 18    Ht 5' 9\" (1.753 m)    Wt 197 lb (89.4 kg)    SpO2 98%    BMI 29.09 kg/m2      Constitutional: Well developed, nourished, no distress, alert   CV: S1, S2.  RRR. No murmurs/rubs. No thrills palpated. No carotid bruits.   Intact distal pulses. No edema. Pulm: No abnormalities on inspection. Clear to auscultation bilaterally. No wheezing/rhonchi. Normal effort. GI: Soft, nontender, nondistended. Normal active bowel sounds. Labwork and Ancillary Studies:    CBC w/Diff  Lab Results   Component Value Date/Time    WBC 9.6 04/10/2017 09:54 AM    HGB 17.5 04/10/2017 09:54 AM    PLATELET 855 43/32/5192 09:54 AM         Basic Metabolic Profile/LFTs  Lab Results   Component Value Date/Time    Sodium 140 01/16/2017 11:28 AM    Potassium 4.5 01/16/2017 11:28 AM    Chloride 104 01/16/2017 11:28 AM    CO2 31 01/16/2017 11:28 AM    Anion gap 5 01/16/2017 11:28 AM    Glucose 79 01/16/2017 11:28 AM    BUN 14 01/16/2017 11:28 AM    Creatinine 1.01 01/16/2017 11:28 AM    BUN/Creatinine ratio 14 01/16/2017 11:28 AM    GFR est AA >60 01/16/2017 11:28 AM    GFR est non-AA >60 01/16/2017 11:28 AM    Calcium 8.3 01/16/2017 11:28 AM      Lab Results   Component Value Date/Time    ALT (SGPT) 26 01/16/2017 11:28 AM    AST (SGOT) 19 01/16/2017 11:28 AM    Alk.  phosphatase 73 01/16/2017 11:28 AM    Bilirubin, total 0.8 01/16/2017 11:28 AM       Cholesterol  Lab Results   Component Value Date/Time    Cholesterol, total 144 01/16/2017 11:28 AM    HDL Cholesterol 44 01/16/2017 11:28 AM    LDL, calculated 80.6 01/16/2017 11:28 AM    Triglyceride 97 01/16/2017 11:28 AM    CHOL/HDL Ratio 3.3 01/16/2017 11:28 AM

## 2017-11-06 ENCOUNTER — HOSPITAL ENCOUNTER (OUTPATIENT)
Dept: LAB | Age: 72
Discharge: HOME | End: 2017-11-06
Payer: MEDICARE

## 2017-11-06 ENCOUNTER — TELEPHONE (OUTPATIENT)
Dept: FAMILY MEDICINE CLINIC | Age: 72
End: 2017-11-06

## 2017-11-06 DIAGNOSIS — E03.4 HYPOTHYROIDISM DUE TO ACQUIRED ATROPHY OF THYROID: ICD-10-CM

## 2017-11-06 LAB — TSH SERPL DL<=0.05 MIU/L-ACNC: 1.15 UIU/ML (ref 0.36–3.74)

## 2017-11-06 PROCEDURE — 84443 ASSAY THYROID STIM HORMONE: CPT | Performed by: INTERNAL MEDICINE

## 2017-11-06 PROCEDURE — 36415 COLL VENOUS BLD VENIPUNCTURE: CPT | Performed by: INTERNAL MEDICINE

## 2017-11-07 DIAGNOSIS — E03.4 HYPOTHYROIDISM DUE TO ACQUIRED ATROPHY OF THYROID: ICD-10-CM

## 2017-11-07 RX ORDER — LEVOTHYROXINE SODIUM 112 UG/1
112 TABLET ORAL
Qty: 90 TAB | Refills: 3 | Status: SHIPPED | OUTPATIENT
Start: 2017-11-07 | End: 2018-03-27 | Stop reason: DRUGHIGH

## 2017-11-13 NOTE — TELEPHONE ENCOUNTER
[facility]ruse 90 day supply is cheapest at $194. Anoro$221, combivent not covered.

## 2017-11-21 ENCOUNTER — TELEPHONE (OUTPATIENT)
Dept: FAMILY MEDICINE CLINIC | Age: 72
End: 2017-11-21

## 2017-11-21 NOTE — TELEPHONE ENCOUNTER
Pt's wife stopped by office to let Dr. Marly Winn know that her  decided that he wants to go with \"Incruse\" for his breathing medication.     Pt's preferred pharmacy is [facility] mail order

## 2018-01-12 ENCOUNTER — OFFICE VISIT (OUTPATIENT)
Dept: FAMILY MEDICINE CLINIC | Age: 73
End: 2018-01-12

## 2018-01-12 VITALS
HEART RATE: 64 BPM | WEIGHT: 200 LBS | BODY MASS INDEX: 28.63 KG/M2 | DIASTOLIC BLOOD PRESSURE: 60 MMHG | HEIGHT: 70 IN | TEMPERATURE: 97.7 F | SYSTOLIC BLOOD PRESSURE: 106 MMHG | RESPIRATION RATE: 19 BRPM

## 2018-01-12 DIAGNOSIS — E03.4 HYPOTHYROIDISM DUE TO ACQUIRED ATROPHY OF THYROID: ICD-10-CM

## 2018-01-12 DIAGNOSIS — J43.2 CENTRILOBULAR EMPHYSEMA (HCC): ICD-10-CM

## 2018-01-12 DIAGNOSIS — Z00.00 MEDICARE ANNUAL WELLNESS VISIT, SUBSEQUENT: Primary | ICD-10-CM

## 2018-01-12 DIAGNOSIS — E66.3 OVERWEIGHT (BMI 25.0-29.9): ICD-10-CM

## 2018-01-12 DIAGNOSIS — E78.00 PURE HYPERCHOLESTEROLEMIA: ICD-10-CM

## 2018-01-12 PROBLEM — Z71.89 ADVANCE CARE PLANNING: Status: RESOLVED | Noted: 2017-01-16 | Resolved: 2018-01-12

## 2018-01-12 RX ORDER — BISMUTH SUBSALICYLATE 262 MG
1 TABLET,CHEWABLE ORAL DAILY
COMMUNITY

## 2018-01-12 RX ORDER — METAPROTERENOL SULFATE 20 MG
TABLET ORAL
COMMUNITY

## 2018-01-12 NOTE — PATIENT INSTRUCTIONS
Medicare Wellness Visit, Male    The best way to live healthy is to have a healthy lifestyle by eating a well-balanced diet, exercising regularly, limiting alcohol and stopping smoking. Regular physical exams and screening tests are another way to keep healthy. Preventive exams provided by your health care provider can find health problems before they become diseases or illnesses. Preventive services including immunizations, screening tests, monitoring and exams can help you take care of your own health. All people over age 72 should have a pneumovax  and and a prevnar shot to prevent pneumonia. These are once in a lifetime unless you and your provider decide differently. All people over 65 should have a yearly flu shot and a tetanus vaccine every 10 years. Screening for diabetes mellitus with a blood sugar test should be done every year. Glaucoma is a disease of the eye due to increased ocular pressure that can lead to blindness and it should be done every year by an eye professional.    Cardiovascular screening tests that check for elevated lipids (fatty part of blood) which can lead to heart disease and strokes should be done every 5 years. Colorectal screening that evaluates for blood or polyps in your colon should be done yearly as a stool test or every five years as a flexible sigmoidoscope or every 10 years as a colonoscopy up to age 76. Men up to age 76 may need a screening blood test for prostate cancer at certain intervals, depending on their personal and family history. This decision is between the patient and his provider. If you have been a smoker or had family history of abdominal aortic aneurysms, you and your provider may decide to schedule an ultrasound test of your aorta. Hepatitis C screening is also recommended for anyone born between [birth year] through [address]. A shingles vaccine is also recommended once in a lifetime after age 61.     Your Medicare Wellness Exam is recommended annually. Here is a list of your current Health Maintenance items with a due date: There are no preventive care reminders to display for this patient. Learning About the [address] Diet  What is the Mediterranean diet? The Mediterranean diet is a style of eating rather than a diet plan. It features foods eaten in [place], [place], [place] and [place], and other countries along the [facility]. It emphasizes eating foods like fish, fruits, vegetables, beans, high-fiber breads and whole grains, nuts, and olive oil. This style of eating includes limited red meat, cheese, and sweets. Why choose the Mediterranean diet? A Mediterranean-style diet may improve heart health. It contains more fat than other heart-healthy diets. But the fats are mainly from nuts, unsaturated oils (such as fish oils and olive oil), and certain nut or seed oils (such as canola, soybean, or flaxseed oil). These fats may help protect the heart and blood vessels. How can you get started on the Mediterranean diet? Here are some things you can do to switch to a more Mediterranean way of eating. What to eat  · Eat a variety of fruits and vegetables each day, such as grapes, blueberries, tomatoes, broccoli, peppers, figs, olives, spinach, eggplant, beans, lentils, and chickpeas. · Eat a variety of whole-grain foods each day, such as oats, brown rice, and whole wheat bread, pasta, and couscous. · Eat fish at least 2 times a week. Try tuna, salmon, mackerel, lake trout, herring, or sardines. · Eat moderate amounts of low-fat dairy products, such as milk, cheese, or yogurt. · Eat moderate amounts of poultry and eggs. · Choose healthy (unsaturated) fats, such as nuts, olive oil, and certain nut or seed oils like canola, soybean, and flaxseed. · Limit unhealthy (saturated) fats, such as butter, palm oil, and coconut oil.  And limit fats found in animal products, such as meat and dairy products made with whole milk. Try to eat red meat only a few times a month in very small amounts. · Limit sweets and desserts to only a few times a week. This includes sugar-sweetened drinks like soda. The Mediterranean diet may also include red wine with your meal-1 glass each day for women and up to 2 glasses a day for men. Tips for eating at home  · Use herbs, spices, garlic, lemon zest, and citrus juice instead of salt to add flavor to foods. · Add avocado slices to your sandwich instead of palm. · Have fish for lunch or dinner instead of red meat. Brush the fish with olive oil, and broil or grill it. · Sprinkle your salad with seeds or nuts instead of cheese. · Cook with olive or canola oil instead of butter or oils that are high in saturated fat. · Switch from 2% milk or whole milk to 1% or fat-free milk. · Dip raw vegetables in a vinaigrette dressing or hummus instead of dips made from mayonnaise or sour cream.  · Have a piece of fruit for dessert instead of a piece of cake. Try baked apples, or have some dried fruit. Tips for eating out  · Try broiled, grilled, baked, or poached fish instead of having it fried or breaded. · Ask your  to have your meals prepared with olive oil instead of butter. · Order dishes made with marinara sauce or sauces made from olive oil. Avoid sauces made from cream or mayonnaise. · Choose whole-grain breads, whole wheat pasta and pizza crust, brown rice, beans, and lentils. · Cut back on butter or margarine on bread. Instead, you can dip your bread in a small amount of olive oil. · Ask for a side salad or grilled vegetables instead of french fries or chips. Where can you learn more? Go to http://romero-yuriy.info/. Enter 070-513-8994 in the search box to learn more about \"Learning About the Mediterranean Diet. \"  Current as of: May 12, 2017  Content Version: 11.4  © 7274-3898 Healthwise, [employer].  Care instructions adapted under license by [employer] (which disclaims liability or warranty for this information). If you have questions about a medical condition or this instruction, always ask your healthcare professional. [address] any warranty or liability for your use of this information.

## 2018-01-12 NOTE — PROGRESS NOTES
Viki Solis is a 67 y.o. male (: 1945) presenting to address:    No chief complaint on file. Vitals:    18 0701   BP: 106/60   Pulse: 64   Resp: 19   Temp: 97.7 °F (36.5 °C)   Weight: 200 lb (90.7 kg)   Height: 5' 9.83\" (1.774 m)   PainSc:   0 - No pain       Hearing/Vision:   No exam data present    Learning Assessment:     Learning Assessment 2014   PRIMARY LEARNER Patient   PRIMARY LANGUAGE ENGLISH   LEARNER PREFERENCE PRIMARY OTHER (COMMENT)   ANSWERED BY Patient   RELATIONSHIP SELF     Depression Screening:     PHQ over the last two weeks 2018   Little interest or pleasure in doing things Not at all   Feeling down, depressed or hopeless Not at all   Total Score PHQ 2 0     Fall Risk Assessment:     Fall Risk Assessment, last 12 mths 2018   Able to walk? Yes   Fall in past 12 months? No     Abuse Screening:     Abuse Screening Questionnaire 2018   Do you ever feel afraid of your partner? N   Are you in a relationship with someone who physically or mentally threatens you? N   Is it safe for you to go home? Y     Coordination of Care Questionaire:   1. Have you been to the ER, urgent care clinic since your last visit? Hospitalized since your last visit? No     2. Have you seen or consulted any other health care providers outside of the [address] [place] [address] since your last visit? Include any pap smears or colon screening. No     Advanced Directive:   1. Do you have an Advanced Directive? yes    2. Would you like information on Advanced Directives?no  There are no preventive care reminders to display for this patient.

## 2018-01-12 NOTE — ACP (ADVANCE CARE PLANNING)
Advance Care Planning (ACP) Provider Conversation Snapshot    Date of ACP Conversation: 01/12/18  Persons included in Conversation:  patient  Length of ACP Conversation in minutes:  <16 minutes (Non-Billable)    Authorized Decision Maker (if patient is incapable of making informed decisions): This person is:   Healthcare Agent/Medical Power of  under Advance Directive Wife Leatha Smoker    For Patients with Decision Making Capacity:   pt is DNR, dDNR on file.   donates his body to [facility] [place] Outcomes / Follow-Up Plan:   Reviewed existing Advance Directive

## 2018-01-12 NOTE — MR AVS SNAPSHOT
Visit Information Date & Time Provider Department Dept. Phone Encounter #  
 1/12/2018  7:00 AM DELVIN Bolanos/[employer] [phone] [id number] Follow-up Instructions Return in about 1 year (around 1/12/2019). Follow-up and Disposition History Upcoming Health Maintenance Date Due Pneumococcal 65+ Low/Medium Risk (2 of 2 - PPSV23) 1/16/2018 GLAUCOMA SCREENING Q2Y 8/1/2018 MEDICARE YEARLY EXAM 1/13/2019 COLONOSCOPY 4/18/2024 DTaP/Tdap/Td series (2 - Td) 1/16/2027 Allergies as of 1/12/2018  Review Complete On: 1/12/2018 By: Prashant Barcenas MD  
  
 Severity Noted Reaction Type Reactions Latex  05/10/2013    Rash Other Plant, Animal, Environmental  04/24/2017    Other (comments) Dust, mold, pollen: congestion Current Immunizations  Never Reviewed Name Date Influenza Vaccine 10/11/2014 Influenza Vaccine Split 10/12/2012 Not reviewed this visit You Were Diagnosed With   
  
 Codes Comments Medicare annual wellness visit, subsequent    -  Primary ICD-10-CM: Z00.00 ICD-9-CM: V70.0 Hypothyroidism due to acquired atrophy of thyroid     ICD-10-CM: E03.4 ICD-9-CM: 244.8, 246.8 Pure hypercholesterolemia     ICD-10-CM: E78.00 ICD-9-CM: 272.0 Overweight (BMI 25.0-29. 9)     ICD-10-CM: S13.2 ICD-9-CM: 278.02 Centrilobular emphysema ([address]     ICD-10-CM: J43.2 ICD-9-CM: 492.8 Vitals BP Pulse Temp Resp Height(growth percentile) Weight(growth percentile) 106/60 (BP 1 Location: Left arm, BP Patient Position: Sitting) 64 97.7 °F (36.5 °C) 19 5' 9.83\" (1.774 m) 200 lb (90.7 kg) BMI Smoking Status 28.84 kg/m2 Former Smoker Vitals History BMI and BSA Data Body Mass Index Body Surface Area  
 28.84 kg/m 2 2.11 m 2 Preferred Pharmacy Pharmacy Name Phone [address] 98[address] [facility] [address] [phone] Your Updated Medication List  
  
   
This list is accurate as of: 1/12/18  7:30 AM.  Always use your most recent med list.  
  
  
  
  
 acetaminophen 500 mg tablet Commonly known as:  TYLENOL Take  by mouth every six (6) hours as needed for Pain. ALEVE 220 mg tablet Generic drug:  naproxen sodium Take 220 mg by mouth two (2) times daily (with meals). aspirin delayed-release 81 mg tablet Take  by mouth daily. fluticasone 50 mcg/actuation nasal spray Commonly known as:  Lorry Sinning 2 Sprays by Both Nostrils route daily. Ajuieimp-Nlto-Gtndmz-Hyalur Ac [id number] mg Cap Take  by mouth.  
  
 levothyroxine 112 mcg tablet Commonly known as:  SYNTHROID Take 1 Tab by mouth Daily (before breakfast). montelukast 10 mg tablet Commonly known as:  SINGULAIR Take 1 Tab by mouth daily. multivitamin tablet Commonly known as:  ONE A DAY Take 1 Tab by mouth daily. simvastatin 80 mg tablet Commonly known as:  ZOCOR  
TAKE 1 TABLET NIGHTLY  
  
 umeclidinium 62.5 mcg/actuation inhaler Commonly known as:  INCRUSE ELLIPTA Take 1 Puff by inhalation daily. Follow-up Instructions Return in about 1 year (around 1/12/2019). To-Do List   
 01/12/2018 Lab:  CBC W/O DIFF   
  
 01/12/2018 Lab:  LIPID PANEL   
  
 01/12/2018 Lab:  METABOLIC PANEL, COMPREHENSIVE   
  
 01/12/2018 Lab:  TSH 3RD GENERATION Patient Instructions Medicare Wellness Visit, Male The best way to live healthy is to have a healthy lifestyle by eating a well-balanced diet, exercising regularly, limiting alcohol and stopping smoking. Regular physical exams and screening tests are another way to keep healthy. Preventive exams provided by your health care provider can find health problems before they become diseases or illnesses.  Preventive services including immunizations, screening tests, monitoring and exams can help you take care of your own health. All people over age 72 should have a pneumovax  and and a prevnar shot to prevent pneumonia. These are once in a lifetime unless you and your provider decide differently. All people over 65 should have a yearly flu shot and a tetanus vaccine every 10 years. Screening for diabetes mellitus with a blood sugar test should be done every year. Glaucoma is a disease of the eye due to increased ocular pressure that can lead to blindness and it should be done every year by an eye professional. 
 
Cardiovascular screening tests that check for elevated lipids (fatty part of blood) which can lead to heart disease and strokes should be done every 5 years. Colorectal screening that evaluates for blood or polyps in your colon should be done yearly as a stool test or every five years as a flexible sigmoidoscope or every 10 years as a colonoscopy up to age 76. Men up to age 76 may need a screening blood test for prostate cancer at certain intervals, depending on their personal and family history. This decision is between the patient and his provider. If you have been a smoker or had family history of abdominal aortic aneurysms, you and your provider may decide to schedule an ultrasound test of your aorta. Hepatitis C screening is also recommended for anyone born between [birth year] through [address]. A shingles vaccine is also recommended once in a lifetime after age 61. Your Medicare Wellness Exam is recommended annually. Here is a list of your current Health Maintenance items with a due date: There are no preventive care reminders to display for this patient. Learning About the [address] Diet What is the Mediterranean diet? The Mediterranean diet is a style of eating rather than a diet plan. It features foods eaten in [place], [place], [place] and [place], and other countries along the [place].  It emphasizes eating foods like fish, fruits, vegetables, beans, high-fiber breads and whole grains, nuts, and olive oil. This style of eating includes limited red meat, cheese, and sweets. Why choose the Mediterranean diet? A Mediterranean-style diet may improve heart health. It contains more fat than other heart-healthy diets. But the fats are mainly from nuts, unsaturated oils (such as fish oils and olive oil), and certain nut or seed oils (such as canola, soybean, or flaxseed oil). These fats may help protect the heart and blood vessels. How can you get started on the Mediterranean diet? Here are some things you can do to switch to a more Mediterranean way of eating. What to eat · Eat a variety of fruits and vegetables each day, such as grapes, blueberries, tomatoes, broccoli, peppers, figs, olives, spinach, eggplant, beans, lentils, and chickpeas. · Eat a variety of whole-grain foods each day, such as oats, brown rice, and whole wheat bread, pasta, and couscous. · Eat fish at least 2 times a week. Try tuna, salmon, mackerel, lake trout, herring, or sardines. · Eat moderate amounts of low-fat dairy products, such as milk, cheese, or yogurt. · Eat moderate amounts of poultry and eggs. · Choose healthy (unsaturated) fats, such as nuts, olive oil, and certain nut or seed oils like canola, soybean, and flaxseed. · Limit unhealthy (saturated) fats, such as butter, palm oil, and coconut oil. And limit fats found in animal products, such as meat and dairy products made with whole milk. Try to eat red meat only a few times a month in very small amounts. · Limit sweets and desserts to only a few times a week. This includes sugar-sweetened drinks like soda. The Mediterranean diet may also include red wine with your meal-1 glass each day for women and up to 2 glasses a day for men. Tips for eating at home · Use herbs, spices, garlic, lemon zest, and citrus juice instead of salt to add flavor to foods. · Add avocado slices to your sandwich instead of palm. · Have fish for lunch or dinner instead of red meat. Brush the fish with olive oil, and broil or grill it. · Sprinkle your salad with seeds or nuts instead of cheese. · Cook with olive or canola oil instead of butter or oils that are high in saturated fat. · Switch from 2% milk or whole milk to 1% or fat-free milk. · Dip raw vegetables in a vinaigrette dressing or hummus instead of dips made from mayonnaise or sour cream. 
· Have a piece of fruit for dessert instead of a piece of cake. Try baked apples, or have some dried fruit. Tips for eating out · Try broiled, grilled, baked, or poached fish instead of having it fried or breaded. · Ask your  to have your meals prepared with olive oil instead of butter. · Order dishes made with marinara sauce or sauces made from olive oil. Avoid sauces made from cream or mayonnaise. · Choose whole-grain breads, whole wheat pasta and pizza crust, brown rice, beans, and lentils. · Cut back on butter or margarine on bread. Instead, you can dip your bread in a small amount of olive oil. · Ask for a side salad or grilled vegetables instead of french fries or chips. Where can you learn more? Go to http://romero-yuriy.info/. Enter [phone] in the search box to learn more about \"Learning About the Mediterranean Diet. \" Current as of: May 12, 2017 Content Version: 11.4 © 1706-4972 Healthwise, Incorporated. Care instructions adapted under license by [employer] (which disclaims liability or warranty for this information). If you have questions about a medical condition or this instruction, always ask your healthcare professional. [place] [address] any warranty or liability for your use of this information. Patient Instructions History Introducing [facility]!    
 Ibarhima Brewster introduces [employer] patient portal. Now you can access parts of your medical record, email your doctor's office, and request medication refills online. 1. In your internet browser, go to [email or web address] 2. Click on the First Time User? Click Here link in the Sign In box. You will see the New Member Sign Up page. 3. Enter your [employer] Access Code exactly as it appears below. You will not need to use this code after youve completed the sign-up process. If you do not sign up before the expiration date, you must request a new code. · [employer] Access Code: [email or web address] Expires: 1/25/2018  6:27 AM 
 
4. Enter the last four digits of your Social Security Number (xxxx) and Date of Birth (mm/dd/yyyy) as indicated and click Submit. You will be taken to the next sign-up page. 5. Create a [employer]uxe[employer] ID. This will be your [employer] login ID and cannot be changed, so think of one that is secure and easy to remember. 6. Create a [employer] password. You can change your password at any time. 7. Enter your Password Reset Question and Answer. This can be used at a later time if you forget your password. 8. Enter your e-mail address. You will receive e-mail notification when new information is available in [address] 9. Click Sign Up. You can now view and download portions of your medical record. 10. Click the Download Summary menu link to download a portable copy of your medical information. If you have questions, please visit the Frequently Asked Questions section of the [employer] website. Remember, [employer] is NOT to be used for urgent needs. For medical emergencies, dial 911. Now available from your iPhone and Android! Please provide this summary of care documentation to your next provider. Your primary care clinician is listed as [address] 13. If you have any questions after today's visit, please call [phone].

## 2018-01-12 NOTE — PROGRESS NOTES
This is a Subsequent Medicare Annual Wellness Exam (AWV) (Performed 12 months after IPPE or effective date of Medicare Part B enrollment)    I have reviewed the patient's medical history in detail and updated the computerized patient record. History     Past Medical History:   Diagnosis Date    Allergic rhinitis     Eczema     Hypercholesterolemia     S/P colonoscopy     pt to schedule 5/12    Shortness of breath     Sinusitis chronic, maxillary     Sleep apnea     does not use a cpap    T. I.A.     2005    Thyroid disease       Past Surgical History:   Procedure Laterality Date    HX APPENDECTOMY      HX CATARACT REMOVAL Left 06/2017    HX CATARACT REMOVAL Right 06/2017    HX HERNIA REPAIR      HX ORTHOPAEDIC      right shoulder surgery 1973    NASAL SCOPE,BX/RMV POLYP/DEBRID       Current Outpatient Prescriptions   Medication Sig Dispense Refill    umeclidinium (INCRUSE ELLIPTA) 62.5 mcg/actuation inhaler Take 1 Puff by inhalation daily. 3 Inhaler 3    levothyroxine (SYNTHROID) 112 mcg tablet Take 1 Tab by mouth Daily (before breakfast). 90 Tab 3    naproxen sodium (ALEVE) 220 mg tablet Take 220 mg by mouth two (2) times daily (with meals).  aspirin delayed-release 81 mg tablet Take  by mouth daily.  montelukast (SINGULAIR) 10 mg tablet Take 1 Tab by mouth daily. 90 Tab 3    simvastatin (ZOCOR) 80 mg tablet TAKE 1 TABLET NIGHTLY 90 Tab 3    fluticasone (FLONASE) 50 mcg/actuation nasal spray 2 Sprays by Both Nostrils route daily. 3 Bottle 3    acetaminophen (TYLENOL) 500 mg tablet Take  by mouth every six (6) hours as needed for Pain.        Allergies   Allergen Reactions    Latex Rash    Other Plant, Animal, Environmental Other (comments)     Dust, mold, pollen: congestion     Family History   Problem Relation Age of Onset    High Cholesterol Mother     Diabetes Father     Diabetes Brother      Social History   Substance Use Topics    Smoking status: Former Smoker Years: 8.00     Types: Cigarettes, Pipe    Smokeless tobacco: Never Used      Comment: 1971    Alcohol use No     Patient Active Problem List   Diagnosis Code    ED (erectile dysfunction) N52.9    Eczema L30.9    Hypothyroid E03.9    Allergic rhinitis J30.9    Pure hypercholesterolemia E78.00    SOSA (dyspnea on exertion) R06.09    ERIK on CPAP G47.33, Z99.89    Centrilobular emphysema (HCC) J43.2    Advance care planning Z71.89    Ventral hernia without obstruction or gangrene K43.9       Depression Risk Factor Screening:     PHQ over the last two weeks 10/27/2017   Little interest or pleasure in doing things Not at all   Feeling down, depressed or hopeless Not at all   Total Score PHQ 2 0     Alcohol Risk Factor Screening: You do not drink alcohol or very rarely. Functional Ability and Level of Safety:   Hearing Loss  Hearing is good. Activities of Daily Living  The home contains: no safety equipment. Patient does total self care    Fall Risk  Fall Risk Assessment, last 12 mths 10/27/2017   Able to walk? Yes   Fall in past 12 months? Yes     Abuse Screen  Patient is not abused    Cognitive Screening   Evaluation of Cognitive Function:  Has your family/caregiver stated any concerns about your memory: no  Normal, Mini Cog test    Patient Care Team   Patient Care Team:  Israel Purcell MD as PCP - General (Internal Medicine)    Assessment/Plan   Education and counseling provided:  Are appropriate based on today's review and evaluation  End-of-Life planning (with patient's consent)    Diagnoses and all orders for this visit:    1. Medicare annual wellness visit, subsequent  -     METABOLIC PANEL, COMPREHENSIVE; Future  -     LIPID PANEL; Future  -     CBC W/O DIFF; Future    2. Hypothyroidism due to acquired atrophy of thyroid  -     TSH 3RD GENERATION; Future    3. Pure hypercholesterolemia  -     METABOLIC PANEL, COMPREHENSIVE; Future  -     LIPID PANEL; Future    4.  Overweight (BMI 25.0-29.9)  -he is working on exercise and diet    5. Centrilobular emphysema (HCC)  -emphysema - much improved on incruse    There are no preventive care reminders to display for this patient.

## 2018-02-12 ENCOUNTER — IMPORTED ENCOUNTER (OUTPATIENT)
Dept: URBAN - METROPOLITAN AREA CLINIC 1 | Facility: CLINIC | Age: 73
End: 2018-02-12

## 2018-02-12 PROBLEM — Z96.1: Noted: 2018-02-12

## 2018-02-12 PROBLEM — H40.013: Noted: 2018-02-12

## 2018-02-12 PROBLEM — H43.811: Noted: 2018-02-12

## 2018-02-12 PROCEDURE — 92133 CPTRZD OPH DX IMG PST SGM ON: CPT

## 2018-02-12 PROCEDURE — 92083 EXTENDED VISUAL FIELD XM: CPT

## 2018-02-12 PROCEDURE — 92014 COMPRE OPH EXAM EST PT 1/>: CPT

## 2018-02-12 PROCEDURE — 92015 DETERMINE REFRACTIVE STATE: CPT

## 2018-02-12 NOTE — PATIENT DISCUSSION
1.  Glaucoma Suspect OU (CD 0.70/0.55): OCT and HVF WNL OU. IOP stable. Patient is considered Low Risk. Condition was discussed with patient and patient understands. Will continue to monitor patient for any progression in condition. Patient was advised to call us with any problems questions or concerns. 2.  Diplopia Complaint-  Mild symptomatically. Prisms not consistent. A tnext visit will recheck vertical diplopia with prism prior to dilation3. Pseudophakia OU - (Standard OU) 3. PVD w/o Tear OD - RD precautions. Return for an appointment in 1 year 30/OCT with Dr. Ankush Tavarez.

## 2018-03-27 DIAGNOSIS — E03.4 HYPOTHYROIDISM DUE TO ACQUIRED ATROPHY OF THYROID: ICD-10-CM

## 2018-03-27 RX ORDER — LEVOTHYROXINE SODIUM 112 UG/1
TABLET ORAL
Qty: 30 TAB | Refills: 3 | Status: SHIPPED | OUTPATIENT
Start: 2018-03-27 | End: 2018-06-21 | Stop reason: SDUPTHER

## 2018-05-17 ENCOUNTER — HOSPITAL ENCOUNTER (OUTPATIENT)
Dept: LAB | Age: 73
Discharge: HOME | End: 2018-05-17
Payer: MEDICARE

## 2018-05-17 DIAGNOSIS — Z00.00 MEDICARE ANNUAL WELLNESS VISIT, SUBSEQUENT: ICD-10-CM

## 2018-05-17 DIAGNOSIS — E03.4 HYPOTHYROIDISM DUE TO ACQUIRED ATROPHY OF THYROID: ICD-10-CM

## 2018-05-17 DIAGNOSIS — E78.00 PURE HYPERCHOLESTEROLEMIA: ICD-10-CM

## 2018-05-17 LAB
ALBUMIN SERPL-MCNC: 3.6 G/DL (ref 3.4–5)
ALBUMIN/GLOB SERPL: 1.2 {RATIO} (ref 0.8–1.7)
ALP SERPL-CCNC: 74 U/L (ref 45–117)
ALT SERPL-CCNC: 18 U/L (ref 16–61)
ANION GAP SERPL CALC-SCNC: 8 MMOL/L (ref 3–18)
AST SERPL-CCNC: 17 U/L (ref 15–37)
BILIRUB SERPL-MCNC: 0.5 MG/DL (ref 0.2–1)
BUN SERPL-MCNC: 12 MG/DL (ref 7–18)
BUN/CREAT SERPL: 12 (ref 12–20)
CALCIUM SERPL-MCNC: 8.7 MG/DL (ref 8.5–10.1)
CHLORIDE SERPL-SCNC: 103 MMOL/L (ref 100–108)
CHOLEST SERPL-MCNC: 128 MG/DL
CO2 SERPL-SCNC: 29 MMOL/L (ref 21–32)
CREAT SERPL-MCNC: 1 MG/DL (ref 0.6–1.3)
ERYTHROCYTE [DISTWIDTH] IN BLOOD BY AUTOMATED COUNT: 13.7 % (ref 11.6–14.5)
GLOBULIN SER CALC-MCNC: 3 G/DL (ref 2–4)
GLUCOSE SERPL-MCNC: 92 MG/DL (ref 74–99)
HCT VFR BLD AUTO: 50.3 % (ref 36–48)
HDLC SERPL-MCNC: 38 MG/DL (ref 40–60)
HDLC SERPL: 3.4 {RATIO} (ref 0–5)
HGB BLD-MCNC: 16.5 G/DL (ref 13–16)
LDLC SERPL CALC-MCNC: 69.6 MG/DL (ref 0–100)
LIPID PROFILE,FLP: ABNORMAL
MCH RBC QN AUTO: 31.4 PG (ref 24–34)
MCHC RBC AUTO-ENTMCNC: 32.8 G/DL (ref 31–37)
MCV RBC AUTO: 95.6 FL (ref 74–97)
PLATELET # BLD AUTO: 251 K/UL (ref 135–420)
PMV BLD AUTO: 9.6 FL (ref 9.2–11.8)
POTASSIUM SERPL-SCNC: 4.6 MMOL/L (ref 3.5–5.5)
PROT SERPL-MCNC: 6.6 G/DL (ref 6.4–8.2)
RBC # BLD AUTO: 5.26 M/UL (ref 4.7–5.5)
SODIUM SERPL-SCNC: 140 MMOL/L (ref 136–145)
TRIGL SERPL-MCNC: 102 MG/DL (ref ?–150)
TSH SERPL DL<=0.05 MIU/L-ACNC: 0.88 UIU/ML (ref 0.36–3.74)
VLDLC SERPL CALC-MCNC: 20.4 MG/DL
WBC # BLD AUTO: 11.2 K/UL (ref 4.6–13.2)

## 2018-05-17 PROCEDURE — 85027 COMPLETE CBC AUTOMATED: CPT | Performed by: INTERNAL MEDICINE

## 2018-05-17 PROCEDURE — 36415 COLL VENOUS BLD VENIPUNCTURE: CPT | Performed by: INTERNAL MEDICINE

## 2018-05-17 PROCEDURE — 80061 LIPID PANEL: CPT | Performed by: INTERNAL MEDICINE

## 2018-05-17 PROCEDURE — 80053 COMPREHEN METABOLIC PANEL: CPT | Performed by: INTERNAL MEDICINE

## 2018-05-17 PROCEDURE — 84443 ASSAY THYROID STIM HORMONE: CPT | Performed by: INTERNAL MEDICINE

## 2018-06-19 ENCOUNTER — TELEPHONE (OUTPATIENT)
Dept: FAMILY MEDICINE CLINIC | Age: 73
End: 2018-06-19

## 2018-06-19 DIAGNOSIS — E78.00 PURE HYPERCHOLESTEROLEMIA: ICD-10-CM

## 2018-06-19 NOTE — TELEPHONE ENCOUNTER
Pt stated he was prescribed a \"mix\" of levothyroxine 112 mcg and 125 mcg. Pt states during a prior appt with Dr. Shweta Bhat the pt was informed to continue the dosage that helped improve pt concern based on lab results. Pt states that medication was  levothyroxine 112 mcg one time daily and would like an authorization for the medication sent to [facility] mail order pharmacy. Please advise.

## 2018-06-20 NOTE — TELEPHONE ENCOUNTER
[facility], they show the 112 mcg is too early to fill. Pt should have enough if he has only used it 2 days per week. Pt seems to be requesting a daily 112. Please advise or send to [facility]. Currently, they have the 125 mcg in processing.

## 2018-06-21 DIAGNOSIS — E03.4 HYPOTHYROIDISM DUE TO ACQUIRED ATROPHY OF THYROID: ICD-10-CM

## 2018-06-21 RX ORDER — LEVOTHYROXINE SODIUM 125 UG/1
TABLET ORAL
Qty: 180 TAB | Refills: 1 | Status: SHIPPED | OUTPATIENT
Start: 2018-06-21

## 2018-06-21 RX ORDER — LEVOTHYROXINE SODIUM 112 UG/1
TABLET ORAL
Qty: 30 TAB | Refills: 3 | Status: SHIPPED | OUTPATIENT
Start: 2018-06-21

## 2018-12-20 RX ORDER — UMECLIDINIUM 62.5 UG/1
AEROSOL, POWDER ORAL
Qty: 3 INHALER | Refills: 3 | Status: SHIPPED | OUTPATIENT
Start: 2018-12-20

## 2021-01-11 NOTE — PATIENT DISCUSSION
The patient expressed a desire to see through the full range of vision from distance, to middle, to near without glasses. The limitations of advanced lens technology were reviewed and the recommendation was made for an extended depth of focus lens (Symfony) in combination with a multifocal lens. Patient understands that each lens will provide only 2 of the 3 ranges of vision. Side effects, specifically halos, reduced contrast, and a 1 in 500 exchange rate due to failure to adapt to the lens were discussed as was the need for enhancement in some cases. The patient elects to proceed with Symfony Opti Blue OD, goal of emmetropia.

## 2021-05-10 NOTE — PATIENT DISCUSSION
Monitor. Patient: Drake Torres   : 1954   66 year old male     Date of Service: 2020     PROCEDURE PERFORMED:  Endoscopic ultrasound with fine needle aspiration (FNA).     INDICATION:  Subepithelial gastric mass    POSTOPERATIVE FINDINGS:  Hypoechoic 4 cm submucosal gastric mass with isoechoic areas, fine needle aspiration (FNA) was performed with the 25 gauge FNB needle and 22 gauge FNA and FNB needle total of 6 passes sent to cytology present on site with adequate specimens reported.  EUS appearance suggestive of GIST.  No lymphadenopathy was appreciated    MEDICATIONS :  As per Anesthesia.  Levaquin 500 mg IV prior the procedure    PROCEDURE SUMMARY:  Indications, alternative procedures, complications related but not limited to infection, bleeding and perforation were discussed with the patient.  After obtaining informed consent, the patient was sedated.  The linear ultrasound endoscope was advanced into the stomach.  Multiple passes were made from the stomach with findings of a hypoechoic 4 cm submucosal gastric mass with isoechoic areas, fine needle aspiration (FNA) was performed with the 25 gauge FNB needle and 22 gauge FNA and FNB needle total of 6 passes sent to cytology present on site with adequate specimens reported.  EUS appearance suggestive of GIST.  No lymphadenopathy was appreciatedy.  Stomach was then decompressed, endoscope withdrawn.    COMPLICATIONS:  None.    IMPRESSION :  Hypoechoic 4 cm submucosal gastric mass with isoechoic areas, fine needle aspiration (FNA) was performed with the 25 gauge FNB needle and 22 gauge FNA and FNB needle total of 6 passes sent to cytology present on site with adequate specimens reported.  EUS appearance suggestive of GIST.  No lymphadenopathy was appreciated    RECOMMENDATIONS:   Follow-up the results of the FNA  Surgical consultation after the results of the FNA are available      Thank you very much for allowing me to participate in the management of this  patient

## 2021-08-03 NOTE — PROCEDURE NOTE: SURGICAL
<p>Prior to commencing surgery patient identification, surgical procedure, site, and side were confirmed by Dr. Gorge Talley. Following topical proparacaine anesthesia, the patient was positioned at the YAG laser, a contact lens coupled to the cornea with methylcellulose and an axial posterior capsulotomy performed without complication using 3.2 Mj x 19. Excess methylcellulose was washed from the eye, one drop of Alphagan was instilled and the patient returned to the holding area having tolerated the procedure well and without complication. </p><p>MRN [id number]</p>

## 2021-08-04 NOTE — PATIENT DISCUSSION
Pt notices improvement in distance [place]; Advised pt to use AT TID OU and F/U in a few weeks with [facility].  May not need Lasik enhancement.

## 2021-08-24 NOTE — PATIENT DISCUSSION
Pt notices improvement in distance [facility]; Advised pt to use AT TID OU and F/U in a few weeks with [facility].  May not need Lasik enhancement.

## 2022-04-02 ASSESSMENT — TONOMETRY
OS_IOP_MMHG: 11
OD_IOP_MMHG: 12
OS_IOP_MMHG: 14
OD_IOP_MMHG: 12
OS_IOP_MMHG: 12
OD_IOP_MMHG: 13
OS_IOP_MMHG: 12
OD_IOP_MMHG: 11
OS_IOP_MMHG: 11
OS_IOP_MMHG: 12
OD_IOP_MMHG: 12
OS_IOP_MMHG: 11

## 2022-04-02 ASSESSMENT — VISUAL ACUITY
OD_SC: 20/50
OD_SC: 20/30-1
OS_CC: 20/30
OD_GLARE: 20/100
OS_SC: 20/20
OS_CC: 20/70+1
OD_CC: J1
OD_GLARE: 20/100
OS_GLARE: 20/80
OD_GLARE: 20/100
OD_SC: 20/50-1
OS_CC: 20/25
OD_CC: 20/30
OS_CC: J2
OD_CC: 20/30-1
OD_CC: J2
OS_CC: 20/30
OS_GLARE: 20/80
OD_CC: J2
OS_CC: J2
OD_SC: 20/50-1
OS_SC: 20/25
OS_CC: J1
OS_SC: 20/25
OS_PH: SC 20/25 -1

## 2022-04-02 ASSESSMENT — KERATOMETRY
OS_AXISANGLE_DEGREES: 162
OS_AXISANGLE2_DEGREES: 072
OD_K2POWER_DIOPTERS: 41.75
OD_AXISANGLE_DEGREES: 014
OD_K1POWER_DIOPTERS: 42.25
OS_K1POWER_DIOPTERS: 42.75
OD_AXISANGLE2_DEGREES: 104
OS_K2POWER_DIOPTERS: 42.25

## 2022-04-12 NOTE — PATIENT DISCUSSION
Discussed with the patient the predictability of visual outcome after cataract surgery is more difficult in previous refractive surgery patients. The patient is at greater risk for the need of enhancement to achieve desired visual outcome.
Good postoperative appearance.
H&P essentially unchanged.
Indications, risks, benefits and alternatives to YAG capsulotomy discussed with patient. Questions answered. Educational handout given.
Instructed to call immediately if any new distortion, blurring, decreased vision or eye pain.
Monitor.
Patient elects to proceed with YAG capsulotomy.
Patient is doing well post-operatively. The importance of post-op drop compliance was emphasized. Drop schedule reviewed with patient. Patient to call if any visual changes or concerns.
Patient made aware of 24/7 emergency services.
Patient understands there is an increased risk of corneal edema after cataract surgery.
Pt notices improvement in distance [place]; Advised pt to use AT TID OU and F/U in a few weeks with [facility].  May not need Lasik enhancement.
Residual Myopia symptomatic.
Start with YAG Laser, then schedule consult for EPI LASEK Enhancement with Dr. Brigida Carvajal.
Toric Axis off (180 vs 005). IOL Rotation is not recommended.
Treating astigmatism with EPI LASIK Enhancement is recommended.
Well healed.
Awake

## 2022-05-03 NOTE — PATIENT DISCUSSION
R/B/A discussed with patient. Discussed in length that patient is not able to refract to 20/20. Recommend Epi-Lasek Yanet OD. Will need to re-refract DOS for stability.

## 2022-12-19 NOTE — PATIENT DISCUSSION
12/19/22 JOD: Recommend second enhancement w/[facility].  Repeat refraction prior to second enhancement.  Recommend pt to start Warm Compress and Hydro eye in addition to the PF AT.

## 2023-01-27 NOTE — PROCEDURE NOTE: CLINICAL
PROCEDURE NOTE: Punctal Plugs, Dissolvable OU. Anesthesia: Proparacaine 0.5%. Prior to treatment, the risks/benefits/alternatives were discussed. The patient wished to proceed with procedure. 1 drop of Proparacaine 0.5% was instilled. Puncta was dilated with punctal dilator. Dissolvable punctal plugs were inserted. Size/location of plugs inserted: Inserted oasis dissolvable softplug extended duration 180 bll 0.5 mm.  no complications. . The patient tolerated the procedure well. There were no complications. Post procedure instructions given. Candy Matos
